# Patient Record
Sex: FEMALE | Race: WHITE | NOT HISPANIC OR LATINO | Employment: UNEMPLOYED | URBAN - METROPOLITAN AREA
[De-identification: names, ages, dates, MRNs, and addresses within clinical notes are randomized per-mention and may not be internally consistent; named-entity substitution may affect disease eponyms.]

---

## 2022-01-24 ENCOUNTER — TRANSCRIBE ORDERS (OUTPATIENT)
Dept: ADMINISTRATIVE | Facility: HOSPITAL | Age: 37
End: 2022-01-24

## 2022-01-24 DIAGNOSIS — R92.8 ABNORMAL MAMMOGRAM: ICD-10-CM

## 2022-01-24 DIAGNOSIS — G93.5 CHIARI I MALFORMATION: Primary | ICD-10-CM

## 2022-02-07 ENCOUNTER — HOSPITAL ENCOUNTER (OUTPATIENT)
Dept: MRI IMAGING | Facility: HOSPITAL | Age: 37
Discharge: HOME | End: 2022-02-07
Admitting: NURSE PRACTITIONER

## 2022-02-07 DIAGNOSIS — G93.5 CHIARI I MALFORMATION: ICD-10-CM

## 2022-02-07 PROCEDURE — 70551 MRI BRAIN STEM W/O DYE: CPT

## 2022-03-16 ENCOUNTER — HOSPITAL ENCOUNTER (OUTPATIENT)
Dept: MAMMOGRAPHY | Facility: HOSPITAL | Age: 37
Discharge: HOME | End: 2022-03-16
Admitting: NURSE PRACTITIONER

## 2022-03-16 ENCOUNTER — OFFICE VISIT (OUTPATIENT)
Dept: NEUROSURGERY | Facility: CLINIC | Age: 37
End: 2022-03-16

## 2022-03-16 VITALS — BODY MASS INDEX: 45.36 KG/M2 | WEIGHT: 256 LBS | HEIGHT: 63 IN

## 2022-03-16 DIAGNOSIS — Z86.69 HISTORY OF CHIARI MALFORMATION: Primary | ICD-10-CM

## 2022-03-16 DIAGNOSIS — R92.8 ABNORMAL MAMMOGRAM: ICD-10-CM

## 2022-03-16 PROBLEM — E66.9 OBESITY: Status: ACTIVE | Noted: 2017-10-02

## 2022-03-16 PROBLEM — M79.7 PRIMARY FIBROMYALGIA SYNDROME: Status: ACTIVE | Noted: 2022-03-16

## 2022-03-16 PROBLEM — G47.419 NARCOLEPSY: Status: ACTIVE | Noted: 2022-03-16

## 2022-03-16 PROBLEM — G93.5 CHIARI MALFORMATION TYPE I: Status: ACTIVE | Noted: 2017-10-02

## 2022-03-16 PROBLEM — R51.9 HEADACHE: Status: ACTIVE | Noted: 2022-03-16

## 2022-03-16 PROBLEM — G47.411 CATAPLEXY AND NARCOLEPSY: Status: ACTIVE | Noted: 2022-03-16

## 2022-03-16 PROCEDURE — 77066 DX MAMMO INCL CAD BI: CPT | Performed by: RADIOLOGY

## 2022-03-16 PROCEDURE — 77066 DX MAMMO INCL CAD BI: CPT

## 2022-03-16 PROCEDURE — 99203 OFFICE O/P NEW LOW 30 MIN: CPT | Performed by: NEUROLOGICAL SURGERY

## 2022-03-16 PROCEDURE — G0279 TOMOSYNTHESIS, MAMMO: HCPCS

## 2022-03-16 PROCEDURE — 77062 BREAST TOMOSYNTHESIS BI: CPT | Performed by: RADIOLOGY

## 2022-03-16 RX ORDER — RIZATRIPTAN BENZOATE 10 MG/1
TABLET ORAL
COMMUNITY

## 2022-03-16 NOTE — PROGRESS NOTES
Patient: Nusrat Valdovinos  : 1985    Primary Care Provider: Liyah Villalba APRN    Requesting Provider: As above        History    Chief Complaint: Chiari malformation.    History of Present Illness: Ms. Valdovinos is a 37-year-old unemployed woman who in 2017 underwent suboccipital decompression in [place] for a Chiari malformation.  Prior to that she had diminished sensation in her right leg and in her left arm.  That improved but some of the symptoms have recurred over the last 6 months.  Since surgery she has had a popping in her neck and suboccipital region that occurs with changing positions.  Her previous neurosurgeon was unsure as to the etiology for that.  One of her main complaints is mid and low back pain.    Review of Systems   Constitutional: Negative for activity change, appetite change, chills, diaphoresis, fatigue, fever and unexpected weight change.   HENT: Negative for congestion, dental problem, drooling, ear discharge, ear pain, facial swelling, hearing loss, mouth sores, nosebleeds, postnasal drip, rhinorrhea, sinus pressure, sneezing, sore throat, tinnitus, trouble swallowing and voice change.    Eyes: Negative for photophobia, pain, discharge, redness, itching and visual disturbance.   Respiratory: Negative for apnea, cough, choking, chest tightness, shortness of breath, wheezing and stridor.    Cardiovascular: Negative for chest pain, palpitations and leg swelling.   Gastrointestinal: Negative for abdominal distention, abdominal pain, anal bleeding, blood in stool, constipation, diarrhea, nausea, rectal pain and vomiting.   Endocrine: Negative for cold intolerance, heat intolerance, polydipsia, polyphagia and polyuria.   Genitourinary: Negative for decreased urine volume, difficulty urinating, dysuria, enuresis, flank pain, frequency, genital sores, hematuria and urgency.   Musculoskeletal: Negative for arthralgias, back pain, gait problem, joint swelling, myalgias,  "neck pain and neck stiffness.   Skin: Negative for color change, pallor, rash and wound.   Allergic/Immunologic: Negative for environmental allergies, food allergies and immunocompromised state.   Neurological: Negative for dizziness, tremors, seizures, syncope, facial asymmetry, speech difficulty, weakness, light-headedness, numbness and headaches.   Hematological: Negative for adenopathy. Does not bruise/bleed easily.   Psychiatric/Behavioral: Negative for agitation, behavioral problems, confusion, decreased concentration, dysphoric mood, hallucinations, self-injury, sleep disturbance and suicidal ideas. The patient is not nervous/anxious and is not hyperactive.    All other systems reviewed and are negative.      The patient's past medical history, past surgical history, family history, and social history have been reviewed at length in the electronic medical record.    Physical Exam:   Ht 160 cm (63\")   Wt 116 kg (256 lb)   BMI 45.35 kg/m²   CONSTITUTIONAL: Patient is well-nourished, pleasant and appears stated age.  MUSCULOSKELETAL:  Neck tenderness to palpation is not observed.   ROM in the neck is normal.  Well-healed midline occipital cervical incision is noted.  NEUROLOGICAL:  Orientation, memory, attention span, language function, and cognition have been examined and are intact.  Strength is intact in the upper and lower extremities to direct testing.  Muscle tone is normal throughout.  Station and gait are normal.  Sensation is intact to light touch testing throughout.  Deep tendon reflexes are 1+ and symmetrical.  Irais's Sign is negative bilaterally. No clonus is elicited.  Coordination is intact.      Medical Decision Making    Data Review:   (All imaging studies were personally reviewed unless stated otherwise)  MRI of the brain demonstrates the Chiari I malformation with the dorsal decompression visible.  The posterior ring of C1 has been removed.  I can visualize the spinal cord to the lower C4 " level and I do not see evidence of syringomyelia.    Diagnosis:   The patient is a history of Chiari malformation with some recurrent symptoms.    Treatment Options:   I am going to check an MRI of her thoracic and lumbar spine for completeness to rule out syringomyelia.  I am also to check flexion-extension lateral cervical spine x-rays to rule out instability.  She will follow-up in our clinic thereafter.       Diagnosis Plan   1. History of Chiari malformation  MRI Thoracic Spine Without Contrast    MRI Lumbar Spine Without Contrast    XR spine cervical flex and ext only       Scribed for Jp Vega MD by No Cordon CMA on 3/16/2022 11:32 EDT       I, Dr. Vega, personally performed the services described in the documentation, as scribed in my presence, and it is both accurate and complete.

## 2022-03-24 ENCOUNTER — TELEPHONE (OUTPATIENT)
Dept: FAMILY MEDICINE CLINIC | Facility: CLINIC | Age: 37
End: 2022-03-24

## 2022-03-24 NOTE — TELEPHONE ENCOUNTER
----- Message from KLAUDIA Gomez sent at 3/21/2022  8:18 AM EDT -----  Will you please let know that her mammogram had a few  abnormal areas and they would like to get a bilateral diagnostic mammo  and breast ultrasound. Thanks!

## 2022-03-30 ENCOUNTER — TELEPHONE (OUTPATIENT)
Dept: FAMILY MEDICINE CLINIC | Facility: CLINIC | Age: 37
End: 2022-03-30

## 2022-03-30 ENCOUNTER — HOSPITAL ENCOUNTER (OUTPATIENT)
Dept: ULTRASOUND IMAGING | Facility: HOSPITAL | Age: 37
Discharge: HOME | End: 2022-03-30

## 2022-03-30 ENCOUNTER — HOSPITAL ENCOUNTER (OUTPATIENT)
Dept: MAMMOGRAPHY | Facility: HOSPITAL | Age: 37
Discharge: HOME | End: 2022-03-30

## 2022-03-30 DIAGNOSIS — R92.8 ABNORMAL MAMMOGRAM: ICD-10-CM

## 2022-03-30 PROCEDURE — 76642 ULTRASOUND BREAST LIMITED: CPT

## 2022-03-30 PROCEDURE — G0279 TOMOSYNTHESIS, MAMMO: HCPCS

## 2022-03-30 PROCEDURE — 76642 ULTRASOUND BREAST LIMITED: CPT | Performed by: RADIOLOGY

## 2022-03-30 PROCEDURE — 77065 DX MAMMO INCL CAD UNI: CPT

## 2022-04-18 ENCOUNTER — HOSPITAL ENCOUNTER (OUTPATIENT)
Dept: MRI IMAGING | Facility: HOSPITAL | Age: 37
Discharge: HOME | End: 2022-04-18

## 2022-04-18 ENCOUNTER — HOSPITAL ENCOUNTER (OUTPATIENT)
Dept: GENERAL RADIOLOGY | Facility: HOSPITAL | Age: 37
Discharge: HOME | End: 2022-04-18

## 2022-04-18 DIAGNOSIS — Z86.69 HISTORY OF CHIARI MALFORMATION: ICD-10-CM

## 2022-04-18 PROCEDURE — 72146 MRI CHEST SPINE W/O DYE: CPT

## 2022-04-18 PROCEDURE — 72148 MRI LUMBAR SPINE W/O DYE: CPT

## 2022-04-18 PROCEDURE — 72040 X-RAY EXAM NECK SPINE 2-3 VW: CPT

## 2022-04-19 ENCOUNTER — OFFICE VISIT (OUTPATIENT)
Dept: NEUROSURGERY | Facility: CLINIC | Age: 37
End: 2022-04-19

## 2022-04-19 VITALS
HEIGHT: 63 IN | TEMPERATURE: 98 F | BODY MASS INDEX: 46.25 KG/M2 | WEIGHT: 261 LBS | DIASTOLIC BLOOD PRESSURE: 84 MMHG | SYSTOLIC BLOOD PRESSURE: 122 MMHG

## 2022-04-19 DIAGNOSIS — M54.9 MID BACK PAIN: ICD-10-CM

## 2022-04-19 DIAGNOSIS — G89.29 CHRONIC BILATERAL LOW BACK PAIN WITHOUT SCIATICA: ICD-10-CM

## 2022-04-19 DIAGNOSIS — M54.50 CHRONIC BILATERAL LOW BACK PAIN WITHOUT SCIATICA: ICD-10-CM

## 2022-04-19 DIAGNOSIS — M54.2 NECK PAIN: ICD-10-CM

## 2022-04-19 DIAGNOSIS — G93.5 CHIARI MALFORMATION TYPE I: Primary | ICD-10-CM

## 2022-04-19 PROCEDURE — 99214 OFFICE O/P EST MOD 30 MIN: CPT | Performed by: PHYSICIAN ASSISTANT

## 2022-04-19 NOTE — PROGRESS NOTES
Subjective     Chief Complaint: Chiari malformation, s/p decompression                                 Posterior neck pain                                 Mid and low back pain    Patient ID: Nusrat Valdovinos is a 37 y.o. female is here today for follow-up.    History of Present Illness   : Ms. Valdovinos is a 37-year-old unemployed woman who in October 2017 underwent suboccipital decompression in [place] for a Chiari malformation.  Prior to that she had diminished sensation in her right leg and in her left arm.  That improved but some of the symptoms have recurred over the last 6 months.  Since surgery she has had a popping in her neck and suboccipital region that occurs with changing positions. It is sometimes associated with an ascending occipital headache.Her previous neurosurgeon was unsure as to the etiology for that.    This pain is worse with prolonged driving or sitting without resting her neck    She has also had a midthoracic pain as well as a low back pain that radiates to both sides especially to her left hip.  The pain tends to flareup at least once a week but she has not been able to elucidate date any pattern to the flareups   Dr. Vega saw her on 3/16/2022 to establish care.  He reviewed an MRI of the brain that showed the Chiari I malformation with postoperative changes of removal of the posterior ring of C1 he ordered flexion-extension cervical x-rays, as well as a thoracic and lumbar MRI to rule out syringomyelia.    The following portions of the patient's history were reviewed and updated as appropriate: allergies, current medications, past family history, past medical history, past social history, past surgical history and problem list.    Family history:   Family History   Problem Relation Age of Onset   • Breast cancer Mother    • Arthritis Mother    • Asthma Mother    • Cancer Mother    • Diabetes Mother    • Early death Mother    • Hyperlipidemia Mother    • Other Mother         Lupus    • Ovarian cancer Maternal Grandmother    • Arthritis Maternal Grandmother    • Breast cancer Paternal Aunt    • Alcohol abuse Father    • Drug abuse Father    • Arthritis Maternal Grandfather    • Asthma Maternal Grandfather    • Diabetes Maternal Grandfather    • Hyperlipidemia Maternal Grandfather    • Anxiety disorder Daughter    • Depression Daughter    • Mental illness Daughter    • Anxiety disorder Maternal Aunt    • Arthritis Maternal Aunt    • Asthma Maternal Aunt    • Depression Maternal Aunt        Social history:   Social History     Socioeconomic History   • Marital status: Legally    Tobacco Use   • Smoking status: Current Every Day Smoker   • Smokeless tobacco: Never Used   Substance and Sexual Activity   • Alcohol use: Yes     Alcohol/week: 3.0 standard drinks     Types: 1 Glasses of wine, 2 Cans of beer per week     Comment: I drink mixed drinks once in awhile on special occasions   • Drug use: Never   • Sexual activity: Yes     Partners: Male     Birth control/protection: Surgical       Review of Systems   Constitutional: Positive for fatigue. Negative for activity change, appetite change, chills, diaphoresis, fever and unexpected weight change.   HENT: Positive for ear discharge, sinus pressure, sneezing and tinnitus. Negative for congestion, dental problem, drooling, ear pain, facial swelling, hearing loss, mouth sores, nosebleeds, postnasal drip, rhinorrhea, sinus pain, sore throat, trouble swallowing and voice change.    Eyes: Positive for itching and visual disturbance. Negative for photophobia, pain, discharge and redness.   Respiratory: Negative for apnea, cough, choking, chest tightness, shortness of breath, wheezing and stridor.    Cardiovascular: Negative for chest pain, palpitations and leg swelling.   Gastrointestinal: Negative for abdominal distention, abdominal pain, anal bleeding, blood in stool, constipation, diarrhea, nausea, rectal pain and vomiting.   Endocrine:  "Negative for cold intolerance, heat intolerance, polydipsia, polyphagia and polyuria.   Genitourinary: Positive for flank pain. Negative for decreased urine volume, difficulty urinating, dyspareunia, dysuria, enuresis, frequency, genital sores, hematuria, menstrual problem, pelvic pain, urgency, vaginal bleeding, vaginal discharge and vaginal pain.   Musculoskeletal: Positive for arthralgias, back pain, neck pain and neck stiffness. Negative for gait problem, joint swelling and myalgias.   Skin: Negative for color change, pallor, rash and wound.   Allergic/Immunologic: Positive for immunocompromised state. Negative for environmental allergies and food allergies.   Neurological: Positive for light-headedness and headaches. Negative for dizziness, tremors, seizures, syncope, facial asymmetry, speech difficulty, weakness and numbness.   Hematological: Negative for adenopathy. Does not bruise/bleed easily.   Psychiatric/Behavioral: Negative for agitation, behavioral problems, confusion, decreased concentration, dysphoric mood, hallucinations, self-injury, sleep disturbance and suicidal ideas. The patient is not nervous/anxious and is not hyperactive.        Objective   Blood pressure 122/84, temperature 98 °F (36.7 °C), temperature source Infrared, height 160 cm (63\"), weight 118 kg (261 lb).  Body mass index is 46.23 kg/m².    Physical Exam  CONSTITUTIONAL: Patient is well-nourished, pleasant and appears stated age.  MUSCULOSKELETAL:  Neck tenderness to palpation is not observed.   ROM in the neck is normal.  Well-healed midline occipital cervical incision is noted.  NEUROLOGICAL:  Orientation, memory, attention span, language function, and cognition have been examined and are intact.  Strength is intact in the upper and lower extremities to direct testing.  Muscle tone is normal throughout.  Station and gait are normal.  Sensation is intact to light touch testing throughout.  Deep tendon reflexes are 1+ and " symmetrical.  Irais's Sign is negative bilaterally. No clonus is elicited.  Coordination is intact.  BMI is 46.23      Radiology results. All studies were performed on 4/18/22. They were reviewed with Dr. Vega as well as with the patient in the exam room.     Flexion-extension x-rays of the cervical spine were reviewed today.  These show postoperative changes as noted above.  There is no evidence of instability with flexion or extension.  Degenerative changes are present but fairly mild    MRI of the thoracic spine was also reviewed.  This shows a very mild kyphosis of the mid thoracic spine but the spinal canal is patent and no significant degenerative disc disease or neuroforaminal impingement is noted    MRI of the lumbar spine also dated 4/18/2022 was reviewed.  This shows some degenerative disc disease especially at L4-5 and L5-S1 with broad-based disc bulges at these levels and possible right L5 foraminal stenosis    Assessment/Plan   There is no good radiographic explanation for Ms. Valdovinos's cervical pain.  MRI of brain also showed the upper part of the cervical spine and flexion-extension x-rays also did not note any instability or other pathology.    MRI of the thoracic spine was essentially normal.  MRI of the lumbar spine showed some mild degenerative changes.  She is not having any consistent radicular symptoms    We discussed exercise and weight loss.  She has been deconditioned due to to moving and caring for her children etc.  We stressed the need to prioritize self-care and exercise as well as physical therapy for core strengthening.  She is eager to participate with this as well.  We have ordered physical therapy and will plan to follow-up with her on an as-needed basis going forward    Diagnoses and all orders for this visit:    1. Chiari malformation type I (HCC) (Primary)  -     Ambulatory Referral to Physical Therapy    2. Chronic bilateral low back pain without sciatica  -     Ambulatory  Referral to Physical Therapy    3. Mid back pain  -     Ambulatory Referral to Physical Therapy    4. Neck pain  -     Ambulatory Referral to Physical Therapy        Return if symptoms worsen or fail to improve.        BRIANNA OrtegaC

## 2022-06-30 ENCOUNTER — TREATMENT (OUTPATIENT)
Dept: PHYSICAL THERAPY | Facility: CLINIC | Age: 37
End: 2022-06-30

## 2022-06-30 DIAGNOSIS — M54.2 PAIN, NECK: Primary | ICD-10-CM

## 2022-06-30 DIAGNOSIS — G89.29 CHRONIC BILATERAL LOW BACK PAIN, UNSPECIFIED WHETHER SCIATICA PRESENT: ICD-10-CM

## 2022-06-30 DIAGNOSIS — M54.50 CHRONIC BILATERAL LOW BACK PAIN, UNSPECIFIED WHETHER SCIATICA PRESENT: ICD-10-CM

## 2022-06-30 PROCEDURE — 97163 PT EVAL HIGH COMPLEX 45 MIN: CPT | Performed by: PHYSICAL THERAPIST

## 2022-06-30 PROCEDURE — 97110 THERAPEUTIC EXERCISES: CPT | Performed by: PHYSICAL THERAPIST

## 2022-06-30 NOTE — PROGRESS NOTES
"  Physical Therapy Initial Evaluation and Plan of Care      Patient: Nusrat Valdovinos   : 1985  Diagnosis/ICD-10 Code:  The primary encounter diagnosis was Pain, neck. A diagnosis of Chronic bilateral low back pain, unspecified whether sciatica present was also pertinent to this visit.   Referring practitioner: Debora Bailey PA-C  Date of Initial Visit: Type: THERAPY  Noted: 2022  Today's Date: 2022  Patient seen for 1 sessions         Visit Diagnoses:    ICD-10-CM ICD-9-CM   1. Pain, neck  M54.2 723.1   2. Chronic bilateral low back pain, unspecified whether sciatica present  M54.50 724.2    G89.29 338.29       Subjective Questionnaire: NDI: 48%    Subjective Evaluation    History of Present Illness  Onset date: chronic for more than 5 years.  Date of surgery: 5 years ago: dorsal decompression, ring of C1 removed.  Mechanism of injury: Pt has multiple complaints, including neck pain, intermittent B UE numbness several times per day, mid and low back pain, intermittent R LE (for the past 9 years, since she was 28 years old).  She had surgery 5 years ago in [place] for decompression of Chiari malformation.  She says her neck gets stiff and she has had popping since about 1 year post surgery. She reports headaches and intermittent dizziness (when looking up in clinic today).  She reports 1 fall in the past year.  She has been seen in [place] in Dr. Vega's office to establish care locally.  She has moved a lot in  family.    She has had recent imaging ordered and read by Dr. Vega.    Thoracic MRI-normal  Lumbar MRI-L5-S1 foraminal stenosis  C-spine flex/ext x-ray-stable    Subjective comment: neck pain, back pain (see details below)  Patient Occupation: realtor-in car a lot, on stairs a lot.  Likes reading, sewing, needlework, disc golf.  Has her own gym routine-\"everything brings on symptoms\" Pain  Current pain ratin  At best pain rating: 3 (best in am)  At worst pain " "ratin  Location: neck, upper/mid back, low back, intermittent R leg and B arm symptoms  Quality: R LE int numbness, B arms int numbness numbness/tingling.  Exacerbated by: looking down, driving, stairs (back/legs)  Progression: no change    Hand dominance: right    Diagnostic Tests  Abnormal x-ray: stable flex/ext imaging.  Abnormal MRI: see EMR.    Patient Goals  Patient goals for therapy: decreased pain, increased strength and increased motion (improve dizziness, know how to help symptoms)           Treatment            Functional Testing  Functional Tests Options: Neck Disability Index (NDI) 48%  Objective        Special Questions  Patient is experiencing dizziness and headaches.       Postural Observations  Seated posture: fair  Standing posture: fair    Additional Postural Observation Details  Protracted B scapuale    Neurological Testing     Sensation   Cervical/Thoracic   Left   Intact: light touch    Right   Intact: light touch    Lumbar   Left   Intact: light touch    Right   Intact: light touch    Reflexes   Left   Patellar (L4): normal (2+)  Achilles (S1): absent (0)    Right   Patellar (L4): absent (0)  Achilles (S1): absent (0)    Additional Neurological Details  Pt reports intermittent numbness in B arms and hands several times per day.  Pt reports intermittent R LE and B UE numbness.  She awakes in am with feet numb, this improves as she gets up and moves around.    Active Range of Motion   Cervical/Thoracic Spine   Normal active range of motion    Additional Active Range of Motion Details  Flexion: \"stabbing\" pain in back of neck  Extension: dizziness  B lateral flexion: contralateral upper cervical stretching  B rotation: feels a little tight, no pain  FIS: stretching, no pain or paresthesia  EIS: localized LBP-occurs mostly at TL junction  JUAN: no pain  EIL: deferred initially    B hip and hamstring flexibility WNL.    Strength/Myotome Testing     Left Shoulder     Planes of Motion   Flexion: " 4-   Abduction: 4   External rotation at 0°: 5   Internal rotation at 0°: 5     Right Shoulder     Planes of Motion   Flexion: 4-   Abduction: 4   External rotation at 0°: 5   Internal rotation at 0°: 5     Left Elbow   Flexion: 5  Extension: 5    Right Elbow   Flexion: 5  Extension: 5    Left Wrist/Hand   Wrist extension: 5  Wrist flexion: 5    Right Wrist/Hand   Wrist extension: 5  Wrist flexion: 5    Lumbar   Left   Heel walk: normal  Toe walk: normal    Right   Heel walk: normal  Toe walk: normal    Left Hip   Planes of Motion   Flexion: 4+  Left hip extension strength: NT.  Left hip abductors strength: NT.    Right Hip   Planes of Motion   Flexion: 4+  Right hip extension strength: NT.  Right hip abductors strength: NT.    Left Knee   Flexion: 5  Extension: 5    Right Knee   Flexion: 5  Extension: 4+    Left Ankle/Foot   Dorsiflexion: 5  Plantar flexion: 5  Great toe extension: 5    Right Ankle/Foot   Dorsiflexion: 5  Plantar flexion: 5  Great toe extension: 5    Tests       Thoracic   Negative slump.     Lumbar     Left   Negative passive SLR.     Right   Negative passive SLR.           Assessment & Plan     Assessment  Impairments: abnormal or restricted ROM, activity intolerance, impaired physical strength, lacks appropriate home exercise program and pain with function  Functional Limitations: carrying objects, lifting, sleeping, walking, uncomfortable because of pain, sitting, standing, stooping and unable to perform repetitive tasks  Assessment details: Pt describes involved medical and surgical history.  She indicates that she has gained weight and needs to be able to exercise to prevent further weight gain.  She has had neurological work-up recently and is referred to PT to assist in conditioning while also managing multiple symptoms/complaints.  Prognosis: good    Goals  Plan Goals: 4 weeks   1) Pt demonstrates independence and compliance with initial HEP.  2) Pt reports reduction in frequency,  intensity, and distribution of intermittent extremity symptoms.   3) Pain is decreased to no worse than 5/10 during activity.     8 weeks  1) Pt demonstrates independence in advanced HEP and self-management strategies to prevent/reduce symptom recurrence.  2) Pt reports pain is not constant, is localized, is no worse than 3/10, and is relieved by performance of prescribed HEP.  3) Pt indicates improving tolerance for activity before onset of symptoms.  4) NDI score is improved to 30% or better, indicating improving functional abilities.      Plan  Therapy options: will be seen for skilled therapy services  Planned modality interventions: cryotherapy and thermotherapy (hydrocollator packs)  Planned therapy interventions: manual therapy, neuromuscular re-education, postural training, soft tissue mobilization, strengthening, stretching, therapeutic activities, home exercise program, functional ROM exercises, flexibility, body mechanics training, abdominal trunk stabilization and balance/weight-bearing training  Frequency: 1x week  Duration in visits: 8  Treatment plan discussed with: patient  Plan details: PT weekly for 8 weeks, addressing pain and noted deficits affecting functional abilities.  Continue to assess symptom behavior and response to treatment.        Timed:  Manual Therapy:         mins  84407;  Therapeutic Exercise:    10     mins  00385;     Neuromuscular Parker:        mins  27943;    Therapeutic Activity:          mins  45035;     Gait Training:           mins  64272;     Ultrasound:          mins  94281;    Electrical Stimulation:         mins  65012 ( );  Iontophoresis  ___ mins   75250    Untimed:  Electrical Stimulation:         mins  31272 ( );  Mechanical Traction:         mins  66297;     Timed Treatment:   10   mins   Total Treatment:     40   mins    PT SIGNATURE: Ramona Howard PT   Electronically signed  DATE TREATMENT INITIATED: 6/30/2022    Initial  Certification  Certification Period: 6/30/2022thru9/27/2022  I certify that the therapy services are furnished while this patient is under my care.  The services outlined above are required by this patient, and will be reviewed every 90 days.    Physician Signature:_____________________________________________             PHYSICIAN: Debora Bailey PA-C  NPI: [id number]                                      DATE:       Please sign and return via fax to [phone].. Thank you, [facility].

## 2022-08-01 ENCOUNTER — TELEPHONE (OUTPATIENT)
Dept: PHYSICAL THERAPY | Facility: CLINIC | Age: 37
End: 2022-08-01

## 2022-08-05 ENCOUNTER — OFFICE VISIT (OUTPATIENT)
Dept: FAMILY MEDICINE CLINIC | Facility: CLINIC | Age: 37
End: 2022-08-05

## 2022-08-05 VITALS
HEIGHT: 63 IN | BODY MASS INDEX: 46.07 KG/M2 | OXYGEN SATURATION: 98 % | DIASTOLIC BLOOD PRESSURE: 84 MMHG | WEIGHT: 260 LBS | HEART RATE: 68 BPM | SYSTOLIC BLOOD PRESSURE: 120 MMHG

## 2022-08-05 DIAGNOSIS — D22.9 CHANGE IN MULTIPLE NEVI: ICD-10-CM

## 2022-08-05 DIAGNOSIS — J40 BRONCHITIS: Primary | ICD-10-CM

## 2022-08-05 PROCEDURE — 99214 OFFICE O/P EST MOD 30 MIN: CPT | Performed by: NURSE PRACTITIONER

## 2022-08-05 RX ORDER — AZITHROMYCIN 250 MG/1
TABLET, FILM COATED ORAL
Qty: 6 TABLET | Refills: 0 | Status: SHIPPED | OUTPATIENT
Start: 2022-08-05 | End: 2022-08-10

## 2022-08-05 RX ORDER — PREDNISONE 20 MG/1
TABLET ORAL
Qty: 18 TABLET | Refills: 0 | Status: SHIPPED | OUTPATIENT
Start: 2022-08-05 | End: 2022-09-28

## 2022-08-05 RX ORDER — DEXTROMETHORPHAN HYDROBROMIDE AND PROMETHAZINE HYDROCHLORIDE 15; 6.25 MG/5ML; MG/5ML
5 SOLUTION ORAL 4 TIMES DAILY PRN
Qty: 200 ML | Refills: 0 | Status: SHIPPED | OUTPATIENT
Start: 2022-08-05 | End: 2022-09-28

## 2022-08-05 NOTE — PROGRESS NOTES
"Chief Complaint  mole ripped off and Cough (Had covid x 1 month ago. Can't get rid of the cough. Head pressure, cough so hard it feels like she is going to pass out. Pain in back on head)    Subjective          Nusrat Lori Valdovinos presents to [facility]    Pt has had scattered nevi on her body for many years. Her bra strap catches a few on her back at times and causes injury to the lesion. She has had a non-productive cough x 1 month since her covid diagnosis. She denies fever, chills, myalgias, or dyspnea.       Objective   Vital Signs:   /84   Pulse 68   Ht 160 cm (63\")   Wt 118 kg (260 lb)   SpO2 98%   BMI 46.06 kg/m²     Body mass index is 46.06 kg/m².    Review of Systems   Constitutional: Negative for fatigue and fever.   Respiratory: Negative for shortness of breath.    Cardiovascular: Negative for chest pain, palpitations and leg swelling.   Neurological: Negative for syncope.   Psychiatric/Behavioral: The patient is not nervous/anxious.           Current Outpatient Medications:   •  rizatriptan (MAXALT) 10 MG tablet, Maxalt 10 mg tablet, Disp: , Rfl:   •  azithromycin (Zithromax Z-Miguel) 250 MG tablet, Take 2 tablets by mouth Daily for 1 day, THEN 1 tablet Daily for 4 days., Disp: 6 tablet, Rfl: 0  •  predniSONE (DELTASONE) 20 MG tablet, 3 QD x 3 days, 2 QD x 3 days, 1 QD x 3 days, Disp: 18 tablet, Rfl: 0  •  promethazine-dextromethorphan (PROMETHAZINE-DM) 6.25-15 MG/5ML solution, Take 5 mL by mouth 4 (Four) Times a Day As Needed for Cough., Disp: 200 mL, Rfl: 0      Allergies: Cipro [ciprofloxacin hcl] and Latex    Physical Exam  Constitutional:       Appearance: Normal appearance.   HENT:      Head: Normocephalic.   Eyes:      Conjunctiva/sclera: Conjunctivae normal.      Pupils: Pupils are equal, round, and reactive to light.   Cardiovascular:      Rate and Rhythm: Normal rate and regular rhythm.      Heart sounds: Normal heart sounds.   Pulmonary:      Effort: " Pulmonary effort is normal.      Breath sounds: Normal breath sounds.   Abdominal:      Tenderness: There is no abdominal tenderness.   Musculoskeletal:         General: Normal range of motion.   Skin:     General: Skin is warm and dry.      Capillary Refill: Capillary refill takes less than 2 seconds.      Comments: Scattered nevi on back   Neurological:      General: No focal deficit present.      Mental Status: She is alert and oriented to person, place, and time.   Psychiatric:         Mood and Affect: Mood normal.         Behavior: Behavior normal.         Thought Content: Thought content normal.         Judgment: Judgment normal.          Result Review :                   Assessment and Plan    Diagnoses and all orders for this visit:    1. Bronchitis (Primary)  Comments:  Finish antibiotics and prednisone. Phen DM PRN cough. Increase fluids. Return for worsened sx and if not improving in 1 week.   Orders:  -     predniSONE (DELTASONE) 20 MG tablet; 3 QD x 3 days, 2 QD x 3 days, 1 QD x 3 days  Dispense: 18 tablet; Refill: 0  -     azithromycin (Zithromax Z-Miguel) 250 MG tablet; Take 2 tablets by mouth Daily for 1 day, THEN 1 tablet Daily for 4 days.  Dispense: 6 tablet; Refill: 0  -     promethazine-dextromethorphan (PROMETHAZINE-DM) 6.25-15 MG/5ML solution; Take 5 mL by mouth 4 (Four) Times a Day As Needed for Cough.  Dispense: 200 mL; Refill: 0    2. Change in multiple nevi  Comments:  Follow up with dermatology.  Orders:  -     Ambulatory Referral to Dermatology                Follow Up   Return in about 1 week (around 8/12/2022) for if not improving or sooner if symptoms worsen.  Patient was given instructions and counseling regarding her condition or for health maintenance advice. Please see specific information pulled into the AVS if appropriate.     KLAUDIA Zuniga

## 2022-08-08 ENCOUNTER — TREATMENT (OUTPATIENT)
Dept: PHYSICAL THERAPY | Facility: CLINIC | Age: 37
End: 2022-08-08

## 2022-08-08 DIAGNOSIS — G89.29 CHRONIC BILATERAL LOW BACK PAIN, UNSPECIFIED WHETHER SCIATICA PRESENT: ICD-10-CM

## 2022-08-08 DIAGNOSIS — M54.50 CHRONIC BILATERAL LOW BACK PAIN, UNSPECIFIED WHETHER SCIATICA PRESENT: ICD-10-CM

## 2022-08-08 DIAGNOSIS — M54.2 PAIN, NECK: Primary | ICD-10-CM

## 2022-08-08 PROCEDURE — 97110 THERAPEUTIC EXERCISES: CPT | Performed by: PHYSICAL THERAPIST

## 2022-08-08 NOTE — PROGRESS NOTES
Physical Therapy Progress Note    Patient: Nusrat Valdovinos   : 1985  Diagnosis/ICD-10 Code:  The primary encounter diagnosis was Pain, neck. A diagnosis of Chronic bilateral low back pain, unspecified whether sciatica present was also pertinent to this visit.   Referring practitioner: Debora Bailey PA-C  Date of Initial Visit: Type: THERAPY  Noted: 2022  Today's Date: 2022  Visit:  2     Nusrat Valdovinos reports: she is about the same.  Since her initial evaluation, she has been to [place] to  her son.  While there, she estimates she walked up to 10 miles per day on several days.  Yesterday, she visited the [facility] encounter in [facility].  She was on her feet a lot.  She reports moderate low back and R leg pain today.  (Gait is initially antalgic when entering department).    Subjective   NDI score improved from 48% to 38%.    Treatment  Pre-treatment pain:  5  Post-treatment pain:  5    Exercise 1  Exercise Name 1: Reassessment (first follow up since evaluation on 22)  Exercise 2  Exercise Name 2: HEP review and practice  Exercise 3  Exercise Name 3: addition to HEP-supine hip flexor stretch  Exercise 4  Exercise Name 4: hooklying lower abdominal activation (legs elevated/supported on table)  Exercise 5  Exercise Name 5: hooklying hip adduction ball squeeze  Exercise 6  Exercise Name 6: abdominal crunch  Exercise 7  Exercise Name 7: standing hip flexor stretch (better tolerated in supine)  Exercise 8  Exercise Name 8: shallow wall squat-bothers R knee-on hold for now             Objective     Assessment & Plan     Assessment    Assessment details: Pt demonstrates improving gait quality by end of session.  She will benefit from gradually progressive exercises as tolerated.    Goals  Plan Goals: Plan Goals: 4 weeks   1) Pt demonstrates independence and compliance with initial HEP-MET.  2) Pt reports reduction in frequency, intensity, and distribution of intermittent extremity  symptoms-not met.   3) Pain is decreased to no worse than 5/10 during activity-not met.     8 weeks-progressing/ongoing  1) Pt demonstrates independence in advanced HEP and self-management strategies to prevent/reduce symptom recurrence.  2) Pt reports pain is not constant, is localized, is no worse than 3/10, and is relieved by performance of prescribed HEP.  3) Pt indicates improving tolerance for activity before onset of symptoms.  4) NDI score is improved to 30% or better, indicating improving functional abilities.    Plan  Plan details: Continue per POC.               Timed:  Manual Therapy:         mins  38302;  Therapeutic Exercise:    40     mins  87143;     Neuromuscular Parker:        mins  50504;    Therapeutic Activity:          mins  01749;     Gait Training:           mins  78704;     Ultrasound:          mins  88984;    Electrical Stimulation:         mins  65924 ( );    Untimed:  Electrical Stimulation:         mins  39028 ( );  Mechanical Traction:         mins  07911;     Timed Treatment:   40   mins   Total Treatment:     40   mins      Ramona Howard, PT  Physical Therapist

## 2022-08-10 ENCOUNTER — TELEPHONE (OUTPATIENT)
Dept: FAMILY MEDICINE CLINIC | Facility: CLINIC | Age: 37
End: 2022-08-10

## 2022-08-10 NOTE — TELEPHONE ENCOUNTER
Pt has finished the abx and is now on the lowest dose of the steroid. She is not any better, she having hard time breathing. No appts available.

## 2022-08-11 ENCOUNTER — OFFICE VISIT (OUTPATIENT)
Dept: FAMILY MEDICINE CLINIC | Facility: CLINIC | Age: 37
End: 2022-08-11

## 2022-08-11 VITALS
OXYGEN SATURATION: 96 % | HEIGHT: 63 IN | DIASTOLIC BLOOD PRESSURE: 84 MMHG | SYSTOLIC BLOOD PRESSURE: 122 MMHG | WEIGHT: 268 LBS | HEART RATE: 82 BPM | BODY MASS INDEX: 47.48 KG/M2

## 2022-08-11 DIAGNOSIS — J40 BRONCHITIS: Primary | ICD-10-CM

## 2022-08-11 DIAGNOSIS — R05.9 COUGH: ICD-10-CM

## 2022-08-11 PROCEDURE — 99214 OFFICE O/P EST MOD 30 MIN: CPT | Performed by: NURSE PRACTITIONER

## 2022-08-11 RX ORDER — CEFDINIR 300 MG/1
300 CAPSULE ORAL 2 TIMES DAILY
Qty: 20 CAPSULE | Refills: 0 | Status: SHIPPED | OUTPATIENT
Start: 2022-08-11 | End: 2022-09-28

## 2022-08-11 RX ORDER — ALBUTEROL SULFATE 2.5 MG/3ML
2.5 SOLUTION RESPIRATORY (INHALATION) EVERY 4 HOURS PRN
Qty: 100 ML | Refills: 12 | Status: SHIPPED | OUTPATIENT
Start: 2022-08-11 | End: 2022-09-28

## 2022-08-11 RX ORDER — FLUCONAZOLE 150 MG/1
150 TABLET ORAL
Qty: 3 TABLET | Refills: 0 | Status: SHIPPED | OUTPATIENT
Start: 2022-08-11 | End: 2022-09-28

## 2022-08-11 NOTE — PROGRESS NOTES
"Chief Complaint  Cough (No imp)    Subjective          Nusrat Lori Valdovinos presents to [facility]  Pt has had cough and congestion x 1 month. She has been taking prednisone and finished Z-brook but states her sx have not improved. She denies fever, chills, or myalgias. She reports wheezing and dyspnea at times.       Objective   Vital Signs:   /84   Pulse 82   Ht 160 cm (63\")   Wt 122 kg (268 lb)   SpO2 96%   BMI 47.47 kg/m²     Body mass index is 47.47 kg/m².    Review of Systems   Constitutional: Negative for fatigue and fever.   HENT: Positive for congestion.    Respiratory: Positive for cough and wheezing. Negative for shortness of breath.    Cardiovascular: Negative for chest pain, palpitations and leg swelling.   Neurological: Negative for syncope.   Psychiatric/Behavioral: The patient is not nervous/anxious.           Current Outpatient Medications:   •  predniSONE (DELTASONE) 20 MG tablet, 3 QD x 3 days, 2 QD x 3 days, 1 QD x 3 days, Disp: 18 tablet, Rfl: 0  •  promethazine-dextromethorphan (PROMETHAZINE-DM) 6.25-15 MG/5ML solution, Take 5 mL by mouth 4 (Four) Times a Day As Needed for Cough., Disp: 200 mL, Rfl: 0  •  rizatriptan (MAXALT) 10 MG tablet, Maxalt 10 mg tablet, Disp: , Rfl:   •  albuterol (PROVENTIL) (2.5 MG/3ML) 0.083% nebulizer solution, Take 2.5 mg by nebulization Every 4 (Four) Hours As Needed for Wheezing., Disp: 100 mL, Rfl: 12  •  cefdinir (OMNICEF) 300 MG capsule, Take 1 capsule by mouth 2 (Two) Times a Day., Disp: 20 capsule, Rfl: 0  •  fluconazole (Diflucan) 150 MG tablet, Take 1 tablet by mouth Every 3 (Three) Days., Disp: 3 tablet, Rfl: 0      Allergies: Cipro [ciprofloxacin hcl] and Latex    Physical Exam  Constitutional:       Appearance: Normal appearance.   HENT:      Head: Normocephalic.   Eyes:      Conjunctiva/sclera: Conjunctivae normal.      Pupils: Pupils are equal, round, and reactive to light.   Cardiovascular:      Rate and " Rhythm: Normal rate and regular rhythm.      Heart sounds: Normal heart sounds.   Pulmonary:      Effort: Pulmonary effort is normal.      Breath sounds: Normal breath sounds.   Abdominal:      Tenderness: There is no abdominal tenderness.   Musculoskeletal:         General: Normal range of motion.   Skin:     General: Skin is warm and dry.      Capillary Refill: Capillary refill takes less than 2 seconds.   Neurological:      General: No focal deficit present.      Mental Status: She is alert and oriented to person, place, and time.   Psychiatric:         Mood and Affect: Mood normal.         Behavior: Behavior normal.         Thought Content: Thought content normal.         Judgment: Judgment normal.          Result Review :                   Assessment and Plan    Diagnoses and all orders for this visit:    1. Bronchitis (Primary)  Comments:  Increase fluids and Mucinex for cough. Albuterol as needed for wheezing. Finish prednisone. Add Cefdinir. Diflucan as needed.   Orders:  -     albuterol (PROVENTIL) (2.5 MG/3ML) 0.083% nebulizer solution; Take 2.5 mg by nebulization Every 4 (Four) Hours As Needed for Wheezing.  Dispense: 100 mL; Refill: 12  -     cefdinir (OMNICEF) 300 MG capsule; Take 1 capsule by mouth 2 (Two) Times a Day.  Dispense: 20 capsule; Refill: 0  -     fluconazole (Diflucan) 150 MG tablet; Take 1 tablet by mouth Every 3 (Three) Days.  Dispense: 3 tablet; Refill: 0    2. Cough  -     XR Chest PA & Lateral; Future                Follow Up   Return in about 1 week (around 8/18/2022) for if not improving or sooner if symptoms worsen.  Patient was given instructions and counseling regarding her condition or for health maintenance advice. Please see specific information pulled into the AVS if appropriate.     KLAUDIA Zuniga

## 2022-08-15 ENCOUNTER — TREATMENT (OUTPATIENT)
Dept: PHYSICAL THERAPY | Facility: CLINIC | Age: 37
End: 2022-08-15

## 2022-08-15 DIAGNOSIS — G89.29 CHRONIC BILATERAL LOW BACK PAIN, UNSPECIFIED WHETHER SCIATICA PRESENT: ICD-10-CM

## 2022-08-15 DIAGNOSIS — M54.50 CHRONIC BILATERAL LOW BACK PAIN, UNSPECIFIED WHETHER SCIATICA PRESENT: ICD-10-CM

## 2022-08-15 DIAGNOSIS — M54.2 PAIN, NECK: Primary | ICD-10-CM

## 2022-08-15 PROCEDURE — 97110 THERAPEUTIC EXERCISES: CPT | Performed by: PHYSICAL THERAPIST

## 2022-08-15 NOTE — PROGRESS NOTES
Physical Therapy Treatment Note  VISIT: 3          Subjective    Nusrat Lori Valdovinos reports: increase in headache due to persistent cough for 3 weeks.  Her low back is not as bad today.      Pre-treatment pain:  4 (low back)  Post-treatment pain:  4      Objective    Treatment    Exercise 1  Exercise Name 1: Nu-Step  Equipment/Resistance 1: level 6  Time: 6'  Exercise 2  Exercise Name 2: supine chin tuck  Exercise 3  Exercise Name 3: supine cervical rotation  Exercise 4  Exercise Name 4: hip adduction/ball squeeze  Exercise 5  Exercise Name 5: lower abdominal hooklying march  Exercise 6  Exercise Name 6: Total gym squats    Manual Rx 1  Manual Rx 1 Location: cervical spine  Manual Rx 1 Type: trial of suboccipital release-not well tolerated or helpful today            Assessment & Plan     Assessment    Assessment details: Exercise tolerance limited by headache, coughing.  LBP less intense than last week.  Knee is also feeling better.    Plan  Plan details: Continue PT.  Progress exercise as tolerated.               Timed:  Manual Therapy:    1     mins  51332;  Therapeutic Exercise:    28     mins  57020;     Neuromuscular Parker:        mins  27886;    Therapeutic Activity:          mins  10636;     Gait Training:           mins  99979;     Ultrasound:          mins  70189;    Electrical Stimulation:         mins  15682 ( );    Untimed:  Electrical Stimulation:         mins  69216 ( );  Mechanical Traction:         mins  10161;  Canalith Repositioning       mins  07564    Timed Treatment:   29   mins   Total Treatment:     29   mins      Ramona Howard, PT  Physical Therapist

## 2022-08-22 ENCOUNTER — TREATMENT (OUTPATIENT)
Dept: PHYSICAL THERAPY | Facility: CLINIC | Age: 37
End: 2022-08-22

## 2022-08-22 DIAGNOSIS — M54.50 CHRONIC BILATERAL LOW BACK PAIN, UNSPECIFIED WHETHER SCIATICA PRESENT: Primary | ICD-10-CM

## 2022-08-22 DIAGNOSIS — M54.2 PAIN, NECK: ICD-10-CM

## 2022-08-22 DIAGNOSIS — G89.29 CHRONIC BILATERAL LOW BACK PAIN, UNSPECIFIED WHETHER SCIATICA PRESENT: Primary | ICD-10-CM

## 2022-08-22 PROCEDURE — 97110 THERAPEUTIC EXERCISES: CPT | Performed by: PHYSICAL THERAPIST

## 2022-08-22 NOTE — PROGRESS NOTES
Physical Therapy Treatment Note  VISIT: 4          Subjective    Nusrat Lori Valdovinos reports: her abdominals are sore from her workout last Friday.  She has new gym membership.  Her headache is down to a dull ache.      Pre-treatment pain:  5  Post-treatment pain:  5      Objective    Treatment    Exercise 1  Exercise Name 1: supine chin tucks  Exercise 2  Exercise Name 2: PPT  Exercise 3  Exercise Name 3: chin tuck+PPT+LE march stabilization  Exercise 4  Exercise Name 4: seated reverse crunch/rollback  Exercise 5  Exercise Name 5: TG squats  Exercise 6  Exercise Name 6: OH ball press while on TG  Exercise 7  Exercise Name 7: modified planks-UE's on countertop  Exercise 8  Exercise Name 8: modified mt climbers                 Assessment & Plan     Assessment    Assessment details: Pt is resuming gym workouts.  She indicates good body awareness and ability to differentiate symptoms from exercise soreness.    Plan  Plan details: Continue PT for remaining scheduled sessions as pt transitions to independent exercise at gym.               Timed:  Manual Therapy:         mins  61620;  Therapeutic Exercise:    30     mins  07468;     Neuromuscular Parker:        mins  81854;    Therapeutic Activity:          mins  90109;     Gait Training:           mins  36654;     Ultrasound:          mins  87643;    Electrical Stimulation:         mins  64197 ( );    Untimed:  Electrical Stimulation:         mins  13421 ( );  Mechanical Traction:         mins  58542;  Canalith Repositioning       mins  95088    Timed Treatment:  30    mins   Total Treatment:     30   mins      Ramona Howard, PT  Physical Therapist

## 2022-08-29 ENCOUNTER — TREATMENT (OUTPATIENT)
Dept: PHYSICAL THERAPY | Facility: CLINIC | Age: 37
End: 2022-08-29

## 2022-08-29 DIAGNOSIS — M54.2 PAIN, NECK: ICD-10-CM

## 2022-08-29 DIAGNOSIS — G89.29 CHRONIC BILATERAL LOW BACK PAIN, UNSPECIFIED WHETHER SCIATICA PRESENT: Primary | ICD-10-CM

## 2022-08-29 DIAGNOSIS — M54.50 CHRONIC BILATERAL LOW BACK PAIN, UNSPECIFIED WHETHER SCIATICA PRESENT: Primary | ICD-10-CM

## 2022-08-29 PROCEDURE — 97110 THERAPEUTIC EXERCISES: CPT | Performed by: PHYSICAL THERAPIST

## 2022-08-29 PROCEDURE — 97140 MANUAL THERAPY 1/> REGIONS: CPT | Performed by: PHYSICAL THERAPIST

## 2022-08-29 NOTE — PROGRESS NOTES
"             Physical Therapy Treatment Note  VISIT: 5          Subjective    Nusrat Lori Valdovinos reports: She continues to work out at gym.  She describes mild, but persistent R occipital headache since she was sick with frequent coughing a few weeks ago.  She reports intermittent low back pain which is sharp/stabbing and feels like it needs to be \"pushed back into place\".      Pre-treatment pain:  3  Post-treatment pain:  3      Objective    Treatment    Exercise 1  Exercise Name 1: supine PPT  Exercise 2  Exercise Name 2: DNF head lifts-increases pain-not continued  Exercise 3  Exercise Name 3: Total Gym squats  Exercise 4  Exercise Name 4: ball taps to knees with marching on total gym  Exercise 5  Exercise Name 5: review of existing HEP  Exercise 6  Exercise Name 6: reverse crunches  Exercise 7  Exercise Name 7: cervical rotation MWM with towel    Manual Rx 1  Manual Rx 1 Location: cervical spine (pain at R occipital area with left rotation  Manual Rx 1 Type: C2 SNAG L with R rotation seemed most relieving to symptoms            Assessment & Plan     Assessment    Assessment details: Pt is doing well transitioning back to gym routine.  She has intermittent LBP.  She will try resuming press ups which had been helpful for her before.  She will also try self-SNAG with rotation to potentially alleviate headache.    Plan  Plan details: Continue next week.  Determine need for additional intervention vs transition to independent exercise.               Timed:  Manual Therapy:    8     mins  92563;  Therapeutic Exercise:    30     mins  95305;     Neuromuscular Parker:        mins  66696;    Therapeutic Activity:          mins  95372;     Gait Training:           mins  20363;     Ultrasound:          mins  14920;    Electrical Stimulation:         mins  00458 ( );    Untimed:  Electrical Stimulation:         mins  33909 ( );  Mechanical Traction:         mins  33020;  Canalith Repositioning       mins  " [id number]    Timed Treatment:   38   mins   Total Treatment:     38   mins      Ramona Howard, PT  Physical Therapist

## 2022-09-28 ENCOUNTER — OFFICE VISIT (OUTPATIENT)
Dept: FAMILY MEDICINE CLINIC | Facility: CLINIC | Age: 37
End: 2022-09-28

## 2022-09-28 ENCOUNTER — TELEPHONE (OUTPATIENT)
Dept: FAMILY MEDICINE CLINIC | Facility: CLINIC | Age: 37
End: 2022-09-28

## 2022-09-28 VITALS
DIASTOLIC BLOOD PRESSURE: 78 MMHG | OXYGEN SATURATION: 98 % | BODY MASS INDEX: 46.49 KG/M2 | HEART RATE: 80 BPM | SYSTOLIC BLOOD PRESSURE: 112 MMHG | HEIGHT: 63 IN | WEIGHT: 262.4 LBS

## 2022-09-28 DIAGNOSIS — M79.605 LEFT LEG PAIN: ICD-10-CM

## 2022-09-28 DIAGNOSIS — S80.812A ABRASION OF LEFT LOWER EXTREMITY, INITIAL ENCOUNTER: ICD-10-CM

## 2022-09-28 DIAGNOSIS — M25.562 ACUTE PAIN OF LEFT KNEE: ICD-10-CM

## 2022-09-28 DIAGNOSIS — M25.511 ACUTE PAIN OF RIGHT SHOULDER: Primary | ICD-10-CM

## 2022-09-28 PROCEDURE — 99214 OFFICE O/P EST MOD 30 MIN: CPT | Performed by: NURSE PRACTITIONER

## 2022-09-28 RX ORDER — CEPHALEXIN 500 MG/1
500 CAPSULE ORAL 2 TIMES DAILY
Qty: 20 CAPSULE | Refills: 0 | Status: SHIPPED | OUTPATIENT
Start: 2022-09-28

## 2022-09-28 RX ORDER — PREDNISONE 20 MG/1
TABLET ORAL
Qty: 18 TABLET | Refills: 0 | Status: SHIPPED | OUTPATIENT
Start: 2022-09-28 | End: 2022-10-07

## 2022-09-28 NOTE — ASSESSMENT & PLAN NOTE
X-rays completed.  Will let know if radiologist sees anything.  We will try course of prednisone.  Avoid NSAIDs.  RICE encouraged.  Risk of meds discussed understood.  If not noticeably improved in a week she will let me know so that we can then discuss further evaluation with orthopedic referral versus MRI.  Obviously she will let me know sooner if worsens.  Return to clinic or ED with any issues or concerns.

## 2022-09-28 NOTE — PROGRESS NOTES
"Chief Complaint  Fall    Subjective          Nusrat Valdovinos presents to [facility]  History of Present Illness     Patient states 16 days ago she tripped and fell and landed on her left knee and then landed on her right shoulder.  States she has had medial knee pain and medial upper lower leg pain.  She also has an abrasion on the front of her upper left lower leg that is scabbed over but does have surrounding erythema.  States there is discharge from the area at times.  No fever no chills no body aches.    Also states pain with in her right shoulder and states it feels like a deep pain.  States she feels like some weakness at times.  No redness no warmth no swelling no lesions of the shoulder.  Has full range of motion of her shoulder but states there is pain when she gets it raised higher than shoulder level.    Objective   Vital Signs:   /78   Pulse 80   Ht 160 cm (63\")   Wt 119 kg (262 lb 6.4 oz)   SpO2 98%   BMI 46.48 kg/m²     Body mass index is 46.48 kg/m².    Review of Systems   Constitutional: Negative for chills and fever.   Cardiovascular: Negative for chest pain and leg swelling.   Musculoskeletal: Positive for arthralgias. Negative for back pain and joint swelling.   Skin: Positive for wound.   Neurological: Negative for dizziness, numbness and headache.       Past History:  Medical History: has a past medical history of Backache, Common migraine, Fibromyalgia, Headache (1998), History of Chiari malformation, Low back pain (2008), MRSA (methicillin resistant Staphylococcus aureus) (4/2009), PCOS (polycystic ovarian syndrome), and Seizures ([facility]) (2017).   Surgical History: has a past surgical history that includes Breast biopsy; Brain surgery (10/2017); and Tonsillectomy.   Family History: family history includes Alcohol abuse in her father; Allergies in her mother and another family member; Anxiety disorder in her daughter and maternal aunt; Arthritis in " her maternal aunt, maternal grandfather, maternal grandmother, and mother; Asthma in her maternal aunt, maternal grandfather, mother, and another family member; Breast cancer in her mother and paternal aunt; Cancer in her mother; Depression in her daughter and maternal aunt; Diabetes in her maternal grandfather, mother, and another family member; Drug abuse in her father; Early death in her mother; Hyperlipidemia in her maternal grandfather and mother; Mental illness in her daughter, mother, and another family member; Migraines in her mother; Other in her mother; Ovarian cancer in her maternal grandmother.   Social History: reports that she quit smoking about 2 months ago. Her smoking use included cigarettes. She started smoking about 17 years ago. She has a 5.50 pack-year smoking history. She has never used smokeless tobacco. She reports current alcohol use of about 3.0 standard drinks of alcohol per week. She reports that she does not use drugs.    PHQ-2 Depression Screening  Little interest or pleasure in doing things? 0-->not at all   Feeling down, depressed, or hopeless? 0-->not at all   PHQ-2 Total Score 0        PHQ-9 Depression Screening  Little interest or pleasure in doing things? 0-->not at all   Feeling down, depressed, or hopeless? 0-->not at all   Trouble falling or staying asleep, or sleeping too much?     Feeling tired or having little energy?     Poor appetite or overeating?     Feeling bad about yourself - or that you are a failure or have let yourself or your family down?     Trouble concentrating on things, such as reading the newspaper or watching television?     Moving or speaking so slowly that other people could have noticed? Or the opposite - being so fidgety or restless that you have been moving around a lot more than usual?     Thoughts that you would be better off dead, or of hurting yourself in some way?     PHQ-9 Total Score 0   If you checked off any problems, how difficult have these  problems made it for you to do your work, take care of things at home, or get along with other people?       PHQ-9 Total Score: 0      Patient screened positive for depression based on a PHQ-9 score of 0 on 9/28/2022. Follow-up recommendations include:       Current Outpatient Medications:   •  rizatriptan (MAXALT) 10 MG tablet, Maxalt 10 mg tablet, Disp: , Rfl:   •  cephalexin (Keflex) 500 MG capsule, Take 1 capsule by mouth 2 (Two) Times a Day., Disp: 20 capsule, Rfl: 0  •  predniSONE (DELTASONE) 20 MG tablet, Take 3 tablets by mouth Daily for 3 days, THEN 2 tablets Daily for 3 days, THEN 1 tablet Daily for 3 days., Disp: 18 tablet, Rfl: 0   (Not in a hospital admission)     Allergies: Cipro [ciprofloxacin hcl] and Latex    Physical Exam  Constitutional:       Appearance: Normal appearance.   Musculoskeletal:      Right shoulder: No swelling, laceration, tenderness or bony tenderness. Decreased range of motion. Normal strength. Normal pulse.      Left knee: No swelling, erythema, ecchymosis or crepitus. Decreased range of motion. Tenderness present. Normal pulse.      Comments: Tenderness present to medial left knee.  Left upper lower leg (more medial) slightly tender to palpation.   Skin:         Neurological:      General: No focal deficit present.      Mental Status: She is alert and oriented to person, place, and time. Mental status is at baseline.   Psychiatric:         Mood and Affect: Mood normal.         Behavior: Behavior normal.         Thought Content: Thought content normal.         Judgment: Judgment normal.          Result Review :                   Assessment and Plan    Diagnoses and all orders for this visit:    1. Acute pain of right shoulder (Primary)  Assessment & Plan:  X-rays completed.  Will let know if radiologist sees anything.  We will try course of prednisone.  Avoid NSAIDs.  RICE encouraged.  Risk of meds discussed understood.  If not noticeably improved in a week she will let me know  so that we can then discuss further evaluation with orthopedic referral versus MRI.  Obviously she will let me know sooner if worsens.  Return to clinic or ED with any issues or concerns.    Orders:  -     XR Shoulder 2+ View Right; Future  -     predniSONE (DELTASONE) 20 MG tablet; Take 3 tablets by mouth Daily for 3 days, THEN 2 tablets Daily for 3 days, THEN 1 tablet Daily for 3 days.  Dispense: 18 tablet; Refill: 0    2. Left leg pain  Assessment & Plan:  X-rays completed.  Will let know if radiologist sees anything.  We will try course of prednisone.  Avoid NSAIDs.  RICE encouraged.  Risk of meds discussed understood.  If not noticeably improved in a week she will let me know so that we can then discuss further evaluation with orthopedic referral versus MRI.  Obviously she will let me know sooner if worsens.  Return to clinic or ED with any issues or concerns.    Orders:  -     XR Tibia Fibula 2 View Left (In Office)  -     predniSONE (DELTASONE) 20 MG tablet; Take 3 tablets by mouth Daily for 3 days, THEN 2 tablets Daily for 3 days, THEN 1 tablet Daily for 3 days.  Dispense: 18 tablet; Refill: 0    3. Acute pain of left knee  Assessment & Plan:  X-rays completed.  Will let know if radiologist sees anything.  We will try course of prednisone.  Avoid NSAIDs.  RICE encouraged.  Risk of meds discussed understood.  If not noticeably improved in a week she will let me know so that we can then discuss further evaluation with orthopedic referral versus MRI.  Obviously she will let me know sooner if worsens.  Return to clinic or ED with any issues or concerns.    Orders:  -     XR Knee 1 or 2 View Left (In Office)    4. Abrasion of left lower extremity, initial encounter  Assessment & Plan:  Antibiotic as prescribed.  Keep clean with soap and water.  Risk discussed understood.  Patient states no risk chance of pregnancy.  Risk of meds discussed understood.  Return in 2 days if no improvement, sooner if worsens.  Return  to clinic or ED with any issues or concerns.    Orders:  -     cephalexin (Keflex) 500 MG capsule; Take 1 capsule by mouth 2 (Two) Times a Day.  Dispense: 20 capsule; Refill: 0                   Follow Up   Return if symptoms worsen or fail to improve.  Patient was given instructions and counseling regarding her condition or for health maintenance advice. Please see specific information pulled into the AVS if appropriate.     KLAUDIA Brown

## 2022-09-28 NOTE — TELEPHONE ENCOUNTER
Hub staff attempted to follow warm transfer process and was unsuccessful     Caller: Nusrat Valdovinos    Relationship to patient: Self    Best call back number: [phone]    Patient is needing: PATIENT HAD A MISSED CALL. VM SAID TO CALL BACK. NO NOTES IN CHART.

## 2022-09-28 NOTE — ASSESSMENT & PLAN NOTE
Antibiotic as prescribed.  Keep clean with soap and water.  Risk discussed understood.  Patient states no risk chance of pregnancy.  Risk of meds discussed understood.  Return in 2 days if no improvement, sooner if worsens.  Return to clinic or ED with any issues or concerns.

## 2022-09-29 ENCOUNTER — TELEPHONE (OUTPATIENT)
Dept: FAMILY MEDICINE CLINIC | Facility: CLINIC | Age: 37
End: 2022-09-29

## 2022-09-29 RX ORDER — FLUCONAZOLE 150 MG/1
150 TABLET ORAL ONCE
Qty: 1 TABLET | Refills: 0 | Status: SHIPPED | OUTPATIENT
Start: 2022-09-29 | End: 2022-09-29

## 2022-09-29 NOTE — TELEPHONE ENCOUNTER
Patient wants to know if you can send over medication for yeast infection, states she usually always gets one when on antibiotics.

## 2022-10-03 ENCOUNTER — TREATMENT (OUTPATIENT)
Dept: PHYSICAL THERAPY | Facility: CLINIC | Age: 37
End: 2022-10-03

## 2022-10-03 DIAGNOSIS — M54.2 PAIN, NECK: ICD-10-CM

## 2022-10-03 DIAGNOSIS — G89.29 CHRONIC BILATERAL LOW BACK PAIN, UNSPECIFIED WHETHER SCIATICA PRESENT: Primary | ICD-10-CM

## 2022-10-03 DIAGNOSIS — M54.50 CHRONIC BILATERAL LOW BACK PAIN, UNSPECIFIED WHETHER SCIATICA PRESENT: Primary | ICD-10-CM

## 2022-10-03 PROCEDURE — 97140 MANUAL THERAPY 1/> REGIONS: CPT | Performed by: PHYSICAL THERAPIST

## 2022-10-03 PROCEDURE — 97110 THERAPEUTIC EXERCISES: CPT | Performed by: PHYSICAL THERAPIST

## 2022-10-03 NOTE — PROGRESS NOTES
Physical Therapy Progress Note    Patient: Nusrat Valdovinos   : 1985  Diagnosis/ICD-10 Code:  The primary encounter diagnosis was Chronic bilateral low back pain, unspecified whether sciatica present. A diagnosis of Pain, neck was also pertinent to this visit.   Referring practitioner: Debora Bailey PA-C  Date of Initial Visit: Type: THERAPY  Noted: 2022  Today's Date: 10/3/2022  Visit:  6     Nusrat Valdovinos reports: she fell 3 weeks ago.  She missed a step in the dark and landed on concrete on her L knee and her R shoulder.  She has had x-rays and nothing is broken.  She is anticipating possible MRI.  Her injuries have limited her ability to perform her HEP.  Her neck is bothering her today.  It feels better if she looks up. Pt says she was doing better until she fell 3 weeks ago.     Subjective     NDI: 38% (initially 48%)  Mod Oswestry: 26% (no baseline measure to compare)    Treatment  Pre-treatment pain:  5 (neck)  Post-treatment pain:  5    Exercise 1  Exercise Name 1: Brief reassessment  Exercise 2  Exercise Name 2: active CROM in sitting  Manual Rx 1  Manual Rx 1 Location: cervical spine  Manual Rx 1 Type: supine manual suboccipital release, gentle R to L sideglides    Functional Testing  Functional Tests Options: Modified [place]ry   Objective   Pt demonstrates normal CROM.      Assessment & Plan     Assessment    Assessment details: Pt has had new fall with injuries to L knee and R shoulder.  Her neck and low back symptoms do not seem to be exacerbated by the fall, but she has been unable to exercise regularly and has had altered gait pattern.  Complete reassessment is limited by new injuries.    Goals  Plan Goals: Plan Goals: 4 weeks   1) Pt demonstrates independence and compliance with initial HEP-MET.  2) Pt reports reduction in frequency, intensity, and distribution of intermittent extremity symptoms-new injury makes this difficult to assess.   3) Pain is decreased to no  worse than 5/10 during activity-MET.     8 weeks-ongoing  1) Pt demonstrates independence in advanced HEP and self-management strategies to prevent/reduce symptom recurrence.  2) Pt reports pain is not constant, is localized, is no worse than 3/10, and is relieved by performance of prescribed HEP.  3) Pt indicates improving tolerance for activity before onset of symptoms.  4) NDI score is improved to 30% or better, indicating improving functional abilities.    Plan  Plan details: Pt will follow up with MD regarding recent injuries.  She will call back to reschedule as needed.               Timed:  Manual Therapy:    10     mins  45664;  Therapeutic Exercise:    20     mins  92226;     Neuromuscular Parker:        mins  52462;    Therapeutic Activity:          mins  18808;     Gait Training:           mins  67793;     Ultrasound:          mins  57274;    Electrical Stimulation:         mins  57711 ( );    Untimed:  Electrical Stimulation:         mins  38146 ( );  Mechanical Traction:         mins  52381;     Timed Treatment:   30   mins   Total Treatment:     30   mins      Ramona Howard, PT  Physical Therapist

## 2022-11-02 ENCOUNTER — DOCUMENTATION (OUTPATIENT)
Dept: PHYSICAL THERAPY | Facility: CLINIC | Age: 37
End: 2022-11-02

## 2022-11-02 NOTE — PROGRESS NOTES
Discharge Summary  Discharge Summary from Physical Therapy Report      Patient: Nusrat Valdovinos   : 1985  Diagnosis/ICD-10 Code:  There were no encounter diagnoses.   Referring practitioner: No ref. provider found  Date of Initial Visit: No linked episodes  Today's Date: 2022  Date of Last Visit: 10-     Number of Visits: 6    Discharge Status of Patient: returning to MD for new injuries  Goals: Partially Met    Discharge Plan: Patient to return to referring/providing physician    Comments:  Pt has not rescheduled to return for treatment.    Date of Discharge: 2022        Ramona Howard, PT

## 2023-08-02 ENCOUNTER — OFFICE VISIT (OUTPATIENT)
Dept: FAMILY MEDICINE CLINIC | Facility: CLINIC | Age: 38
End: 2023-08-02
Payer: MEDICAID

## 2023-08-02 VITALS
OXYGEN SATURATION: 96 % | DIASTOLIC BLOOD PRESSURE: 90 MMHG | HEART RATE: 76 BPM | SYSTOLIC BLOOD PRESSURE: 120 MMHG | HEIGHT: 63 IN | BODY MASS INDEX: 45.54 KG/M2 | WEIGHT: 257 LBS

## 2023-08-02 DIAGNOSIS — Z00.00 ROUTINE MEDICAL EXAM: Primary | ICD-10-CM

## 2023-08-02 DIAGNOSIS — L98.9 SKIN LESION: ICD-10-CM

## 2023-08-02 DIAGNOSIS — E56.9 VITAMIN DEFICIENCY: ICD-10-CM

## 2023-08-02 DIAGNOSIS — R32 URINARY INCONTINENCE, UNSPECIFIED TYPE: ICD-10-CM

## 2023-08-02 DIAGNOSIS — Z13.220 SCREENING FOR LIPID DISORDERS: ICD-10-CM

## 2023-08-02 DIAGNOSIS — J30.1 SEASONAL ALLERGIC RHINITIS DUE TO POLLEN: ICD-10-CM

## 2023-08-02 DIAGNOSIS — Z13.1 SCREENING FOR DIABETES MELLITUS: ICD-10-CM

## 2023-08-02 DIAGNOSIS — E66.01 MORBID (SEVERE) OBESITY DUE TO EXCESS CALORIES: ICD-10-CM

## 2023-08-02 RX ORDER — MONTELUKAST SODIUM 10 MG/1
10 TABLET ORAL NIGHTLY
Qty: 90 TABLET | Refills: 1 | Status: SHIPPED | OUTPATIENT
Start: 2023-08-02

## 2023-08-02 RX ORDER — ALBUTEROL SULFATE 90 UG/1
2 AEROSOL, METERED RESPIRATORY (INHALATION) EVERY 4 HOURS PRN
Qty: 18 G | Refills: 2 | Status: SHIPPED | OUTPATIENT
Start: 2023-08-02

## 2023-08-03 LAB
25(OH)D3+25(OH)D2 SERPL-MCNC: 28.9 NG/ML (ref 30–100)
ALBUMIN SERPL-MCNC: 4.2 G/DL (ref 3.9–4.9)
ALBUMIN/GLOB SERPL: 1.8 {RATIO} (ref 1.2–2.2)
ALP SERPL-CCNC: 107 IU/L (ref 44–121)
ALT SERPL-CCNC: 17 IU/L (ref 0–32)
AST SERPL-CCNC: 19 IU/L (ref 0–40)
BASOPHILS # BLD AUTO: 0.1 X10E3/UL (ref 0–0.2)
BASOPHILS NFR BLD AUTO: 1 %
BILIRUB SERPL-MCNC: 0.5 MG/DL (ref 0–1.2)
BUN SERPL-MCNC: 10 MG/DL (ref 6–20)
BUN/CREAT SERPL: 12 (ref 9–23)
CALCIUM SERPL-MCNC: 9.3 MG/DL (ref 8.7–10.2)
CHLORIDE SERPL-SCNC: 102 MMOL/L (ref 96–106)
CHOLEST SERPL-MCNC: 215 MG/DL (ref 100–199)
CO2 SERPL-SCNC: 25 MMOL/L (ref 20–29)
CREAT SERPL-MCNC: 0.82 MG/DL (ref 0.57–1)
EGFRCR SERPLBLD CKD-EPI 2021: 94 ML/MIN/1.73
EOSINOPHIL # BLD AUTO: 0.4 X10E3/UL (ref 0–0.4)
EOSINOPHIL NFR BLD AUTO: 5 %
ERYTHROCYTE [DISTWIDTH] IN BLOOD BY AUTOMATED COUNT: 13.6 % (ref 11.7–15.4)
FOLATE SERPL-MCNC: 4.6 NG/ML
GLOBULIN SER CALC-MCNC: 2.4 G/DL (ref 1.5–4.5)
GLUCOSE SERPL-MCNC: 99 MG/DL (ref 70–99)
HBA1C MFR BLD: 5.5 % (ref 4.8–5.6)
HCT VFR BLD AUTO: 42.1 % (ref 34–46.6)
HDLC SERPL-MCNC: 51 MG/DL
HGB BLD-MCNC: 13.2 G/DL (ref 11.1–15.9)
IMM GRANULOCYTES # BLD AUTO: 0.1 X10E3/UL (ref 0–0.1)
IMM GRANULOCYTES NFR BLD AUTO: 1 %
LDLC SERPL CALC-MCNC: 130 MG/DL (ref 0–99)
LYMPHOCYTES # BLD AUTO: 2.4 X10E3/UL (ref 0.7–3.1)
LYMPHOCYTES NFR BLD AUTO: 30 %
MCH RBC QN AUTO: 28.6 PG (ref 26.6–33)
MCHC RBC AUTO-ENTMCNC: 31.4 G/DL (ref 31.5–35.7)
MCV RBC AUTO: 91 FL (ref 79–97)
MONOCYTES # BLD AUTO: 0.7 X10E3/UL (ref 0.1–0.9)
MONOCYTES NFR BLD AUTO: 8 %
NEUTROPHILS # BLD AUTO: 4.3 X10E3/UL (ref 1.4–7)
NEUTROPHILS NFR BLD AUTO: 55 %
PLATELET # BLD AUTO: 321 X10E3/UL (ref 150–450)
POTASSIUM SERPL-SCNC: 4.8 MMOL/L (ref 3.5–5.2)
PROT SERPL-MCNC: 6.6 G/DL (ref 6–8.5)
RBC # BLD AUTO: 4.61 X10E6/UL (ref 3.77–5.28)
SODIUM SERPL-SCNC: 140 MMOL/L (ref 134–144)
TRIGL SERPL-MCNC: 190 MG/DL (ref 0–149)
TSH SERPL DL<=0.005 MIU/L-ACNC: 1.92 UIU/ML (ref 0.45–4.5)
VIT B12 SERPL-MCNC: 356 PG/ML (ref 232–1245)
VLDLC SERPL CALC-MCNC: 34 MG/DL (ref 5–40)
WBC # BLD AUTO: 7.9 X10E3/UL (ref 3.4–10.8)

## 2023-11-01 ENCOUNTER — TELEPHONE (OUTPATIENT)
Dept: FAMILY MEDICINE CLINIC | Facility: CLINIC | Age: 38
End: 2023-11-01
Payer: MEDICAID

## 2023-11-01 NOTE — TELEPHONE ENCOUNTER
HUB TO RELAY     This was sent to [facility]. They dont run through insurance so it doesn't require a PA when self paying.

## 2023-11-01 NOTE — TELEPHONE ENCOUNTER
Caller: Nusrat Valdovinos    Relationship to patient: Self    Best call back number: [phone]     Patient is needing: PATIENT STATES SHE WAS PRESCRIBED SEMAGLUTIDE. SHE STATES SHE SPOKE WITH INSURANCE AND THEY TOLD HER THEY WOULD APPROVE WITH A PRIORI AUTHORIZATION.     Semaglutide-Weight Management 1 MG/0.5ML solution auto-injector

## 2024-06-06 ENCOUNTER — OFFICE VISIT (OUTPATIENT)
Dept: FAMILY MEDICINE CLINIC | Facility: CLINIC | Age: 39
End: 2024-06-06
Payer: MEDICAID

## 2024-06-06 VITALS
WEIGHT: 252 LBS | HEART RATE: 91 BPM | HEIGHT: 63 IN | SYSTOLIC BLOOD PRESSURE: 110 MMHG | OXYGEN SATURATION: 96 % | BODY MASS INDEX: 44.65 KG/M2 | DIASTOLIC BLOOD PRESSURE: 80 MMHG

## 2024-06-06 DIAGNOSIS — Z72.0 TOBACCO USE: ICD-10-CM

## 2024-06-06 DIAGNOSIS — E66.01 MORBID (SEVERE) OBESITY DUE TO EXCESS CALORIES: ICD-10-CM

## 2024-06-06 DIAGNOSIS — M25.561 ACUTE PAIN OF RIGHT KNEE: Primary | ICD-10-CM

## 2024-06-06 PROCEDURE — 1160F RVW MEDS BY RX/DR IN RCRD: CPT | Performed by: NURSE PRACTITIONER

## 2024-06-06 PROCEDURE — 1159F MED LIST DOCD IN RCRD: CPT | Performed by: NURSE PRACTITIONER

## 2024-06-06 PROCEDURE — 99213 OFFICE O/P EST LOW 20 MIN: CPT | Performed by: NURSE PRACTITIONER

## 2024-06-06 RX ORDER — PREDNISONE 20 MG/1
TABLET ORAL
Qty: 18 TABLET | Refills: 0 | Status: SHIPPED | OUTPATIENT
Start: 2024-06-06 | End: 2024-06-15

## 2024-06-06 NOTE — ASSESSMENT & PLAN NOTE
X-ray completed.  Will let know if radiologist sees anything.  RICE encouraged.  We will try course of prednisone.  Avoid NSAIDs while on prednisone.  Patient states no risk or chance of pregnancy.  Risk of meds discussed and understood.  Education provided.  Return in 1 week if no improvement, sooner if worsens.  Return to clinic or ED with any issues or concerns.

## 2024-06-06 NOTE — PROGRESS NOTES
"Chief Complaint  Knee Pain (Right knee pain for 2 weeks )    Subjective          Nusrat Valdovinos presents to [facility]  Knee Pain   Pertinent negatives include no numbness.     Patient states since May 17 she has had right knee pain mainly anterior lower knee and right medial knee pain.  No redness no warmth.  States mild swelling at times.  No obvious injury.  States she does have a history of a bad knee and states she believes she possibly had a slight tear in her meniscus years ago but is unsure.  States pain with walking and bending.  She states at times it does feel like her knee could give out and buckle.  No calf pain no leg swelling.    Objective   Vital Signs:   /80   Pulse 91   Ht 160 cm (63\")   Wt 114 kg (252 lb)   SpO2 96%   BMI 44.64 kg/m²     Body mass index is 44.64 kg/m².    Review of Systems   Constitutional:  Negative for chills, fatigue and fever.   Respiratory:  Negative for cough and shortness of breath.    Cardiovascular:  Negative for chest pain and leg swelling.   Genitourinary:  Negative for dysuria.   Musculoskeletal:  Positive for arthralgias. Negative for joint swelling.   Skin:  Negative for color change, rash, skin lesions and wound.   Neurological:  Negative for weakness, numbness and headache.       Past History:  Medical History: has a past medical history of Backache, Common migraine, Fibromyalgia, Headache (1998), History of Chiari malformation, Low back pain (2008), MRSA (methicillin resistant Staphylococcus aureus) (4/2009), PCOS (polycystic ovarian syndrome), and Seizures (2017).   Surgical History: has a past surgical history that includes Breast biopsy; Brain surgery (10/2017); Tonsillectomy; and Hysterectomy (05/2020).   Family History: family history includes Alcohol abuse in her father; Allergies in her mother and another family member; Anxiety disorder in her daughter and maternal aunt; Arthritis in her maternal aunt, " maternal grandfather, maternal grandmother, and mother; Asthma in her maternal aunt, maternal grandfather, mother, and another family member; Breast cancer in her mother and paternal aunt; Cancer in her mother; Depression in her daughter and maternal aunt; Diabetes in her maternal grandfather, mother, and another family member; Drug abuse in her father; Early death in her mother; Hyperlipidemia in her maternal grandfather and mother; Mental illness in her daughter, mother, and another family member; Migraines in her mother; Other in her mother; Ovarian cancer in her maternal grandmother.   Social History: reports that she quit smoking about 22 months ago. Her smoking use included cigarettes. She started smoking about 19 years ago. She has a 8.8 pack-year smoking history. She has never used smokeless tobacco. She reports current alcohol use of about 3.0 standard drinks of alcohol per week. She reports that she does not use drugs.    PHQ-2 Depression Screening  Little interest or pleasure in doing things? 0-->not at all   Feeling down, depressed, or hopeless? 0-->not at all   PHQ-2 Total Score 0        PHQ-9 Depression Screening  Little interest or pleasure in doing things? 0-->not at all   Feeling down, depressed, or hopeless? 0-->not at all   Trouble falling or staying asleep, or sleeping too much?     Feeling tired or having little energy?     Poor appetite or overeating?     Feeling bad about yourself - or that you are a failure or have let yourself or your family down?     Trouble concentrating on things, such as reading the newspaper or watching television?     Moving or speaking so slowly that other people could have noticed? Or the opposite - being so fidgety or restless that you have been moving around a lot more than usual?     Thoughts that you would be better off dead, or of hurting yourself in some way?     PHQ-9 Total Score 0   If you checked off any problems, how difficult have these problems made it  for you to do your work, take care of things at home, or get along with other people?       PHQ-9 Total Score: 0      Patient screened positive for depression based on a PHQ-9 score of 0 on 6/6/2024. Follow-up recommendations include:          Current Outpatient Medications:     albuterol sulfate  (90 Base) MCG/ACT inhaler, Inhale 2 puffs Every 4 (Four) Hours As Needed for Wheezing., Disp: 18 g, Rfl: 2    ibuprofen (ADVIL,MOTRIN) 200 MG tablet, Take 1 tablet by mouth Every 6 (Six) Hours As Needed for Mild Pain., Disp: , Rfl:     montelukast (Singulair) 10 MG tablet, Take 1 tablet by mouth Every Night., Disp: 90 tablet, Rfl: 1    Semaglutide-Weight Management 1 MG/0.5ML solution auto-injector, Inject 0.5 mL under the skin into the appropriate area as directed 1 (One) Time Per Week. Please compound 1mg/1ml. 0.25mg SC weekly x 4 weeks, 0.5mg weekly x 4 weeks, 1 mg weekly thereafter, Disp: 10 mL, Rfl: 5    meclizine (ANTIVERT) 25 MG tablet, Take 1 tablet by mouth 3 (Three) Times a Day As Needed for Dizziness., Disp: 30 tablet, Rfl: 0    predniSONE (DELTASONE) 20 MG tablet, Take 3 tablets by mouth Daily for 3 days, THEN 2 tablets Daily for 3 days, THEN 1 tablet Daily for 3 days., Disp: 18 tablet, Rfl: 0   (Not in a hospital admission)     Allergies: Cipro [ciprofloxacin hcl] and Latex    Physical Exam  Constitutional:       Appearance: Normal appearance.   Cardiovascular:      Rate and Rhythm: Normal rate and regular rhythm.      Heart sounds: Normal heart sounds.   Pulmonary:      Effort: Pulmonary effort is normal.      Breath sounds: Normal breath sounds.   Musculoskeletal:      Right knee: No swelling, erythema, ecchymosis, bony tenderness or crepitus. Normal range of motion. Tenderness present. Normal alignment. Normal pulse.      Right lower leg: No edema.      Left lower leg: No edema.      Comments: Tenderness present to anterior lower knee and medial knee (right knee).     No calf pain. No leg swelling.  No redness.    Skin:     General: Skin is warm.      Findings: No erythema or rash.   Neurological:      General: No focal deficit present.      Mental Status: She is alert and oriented to person, place, and time. Mental status is at baseline.   Psychiatric:         Mood and Affect: Mood normal.         Behavior: Behavior normal.         Thought Content: Thought content normal.         Judgment: Judgment normal.          Result Review :                   Assessment and Plan    Diagnoses and all orders for this visit:    1. Acute pain of right knee (Primary)  Assessment & Plan:  X-ray completed.  Will let know if radiologist sees anything.  RICE encouraged.  We will try course of prednisone.  Avoid NSAIDs while on prednisone.  Patient states no risk or chance of pregnancy.  Risk of meds discussed and understood.  Education provided.  Return in 1 week if no improvement, sooner if worsens.  Return to clinic or ED with any issues or concerns.    Orders:  -     XR Knee 1 or 2 View Right (In Office)  -     predniSONE (DELTASONE) 20 MG tablet; Take 3 tablets by mouth Daily for 3 days, THEN 2 tablets Daily for 3 days, THEN 1 tablet Daily for 3 days.  Dispense: 18 tablet; Refill: 0    2. Tobacco use                      Follow Up   Return if symptoms worsen or fail to improve.  Patient was given instructions and counseling regarding her condition or for health maintenance advice. Please see specific information pulled into the AVS if appropriate.     KLAUDIA Brown

## 2024-06-07 NOTE — TELEPHONE ENCOUNTER
It's been almost a year since I've seen her for this. She needs an appt. Let's schedule her with Allan nam he can write this also. Thanks!

## 2024-06-07 NOTE — TELEPHONE ENCOUNTER
HUB TO RELAY: pt needs to be scheduled with provider to discuss medication. Please get her scheduled.       LVM for pt to call and be scheduled for med recheck.

## 2024-06-11 ENCOUNTER — OFFICE VISIT (OUTPATIENT)
Dept: FAMILY MEDICINE CLINIC | Facility: CLINIC | Age: 39
End: 2024-06-11
Payer: MEDICAID

## 2024-06-11 VITALS
HEIGHT: 63 IN | DIASTOLIC BLOOD PRESSURE: 88 MMHG | OXYGEN SATURATION: 97 % | SYSTOLIC BLOOD PRESSURE: 134 MMHG | BODY MASS INDEX: 44.36 KG/M2 | WEIGHT: 250.38 LBS | HEART RATE: 97 BPM

## 2024-06-11 DIAGNOSIS — Z13.220 SCREENING CHOLESTEROL LEVEL: ICD-10-CM

## 2024-06-11 DIAGNOSIS — E55.9 VITAMIN D DEFICIENCY: ICD-10-CM

## 2024-06-11 DIAGNOSIS — I10 BENIGN ESSENTIAL HTN: ICD-10-CM

## 2024-06-11 DIAGNOSIS — Z13.1 SCREENING FOR DIABETES MELLITUS: ICD-10-CM

## 2024-06-11 DIAGNOSIS — E66.01 CLASS 3 SEVERE OBESITY DUE TO EXCESS CALORIES WITH BODY MASS INDEX (BMI) OF 40.0 TO 44.9 IN ADULT, UNSPECIFIED WHETHER SERIOUS COMORBIDITY PRESENT: Primary | ICD-10-CM

## 2024-06-11 PROCEDURE — 1160F RVW MEDS BY RX/DR IN RCRD: CPT | Performed by: NURSE PRACTITIONER

## 2024-06-11 PROCEDURE — 3079F DIAST BP 80-89 MM HG: CPT | Performed by: NURSE PRACTITIONER

## 2024-06-11 PROCEDURE — 1159F MED LIST DOCD IN RCRD: CPT | Performed by: NURSE PRACTITIONER

## 2024-06-11 PROCEDURE — 3075F SYST BP GE 130 - 139MM HG: CPT | Performed by: NURSE PRACTITIONER

## 2024-06-11 PROCEDURE — 99214 OFFICE O/P EST MOD 30 MIN: CPT | Performed by: NURSE PRACTITIONER

## 2024-06-11 RX ORDER — SEMAGLUTIDE 0.25 MG/.5ML
0.25 INJECTION, SOLUTION SUBCUTANEOUS WEEKLY
Qty: 2 ML | Refills: 0 | Status: SHIPPED | OUTPATIENT
Start: 2024-06-11

## 2024-06-11 RX ORDER — METHYLPREDNISOLONE 4 MG/1
TABLET ORAL
COMMUNITY
End: 2024-06-11

## 2024-06-11 NOTE — PROGRESS NOTES
"Chief Complaint  weight management    Subjective          Nusrat Valdovinos presents to [facility]  History of Present Illness    Patient states through diet and exercise she has been struggling to lose weight.  States she believes Wegovy will be covered with a PA so would like to try and get started on that.  States no risk or chance of pregnancy.  States no personal or family history of thyroid cancer.    She is not fasting today but states she would like to have blood work completed and so she will return in the morning for that.  No dizziness no headache no chest pain no chest pressure no shortness of breath no trouble breathing no urinary or bowel issues.  Overall states she is doing well.    Objective   Vital Signs:   /88   Pulse 97   Ht 160 cm (63\")   Wt 114 kg (250 lb 6 oz)   SpO2 97%   BMI 44.35 kg/m²     Body mass index is 44.35 kg/m².    Review of Systems   Constitutional:  Negative for chills, fatigue and fever.   HENT:  Negative for congestion.    Eyes:  Negative for visual disturbance.   Respiratory:  Negative for cough, shortness of breath and wheezing.    Cardiovascular: Negative.    Gastrointestinal:  Negative for abdominal pain, constipation, diarrhea, nausea and vomiting.   Genitourinary:  Negative for decreased urine volume, dysuria, frequency, hematuria and urgency.   Musculoskeletal:  Negative for back pain.   Neurological:  Negative for headache.   Psychiatric/Behavioral:  Negative for suicidal ideas.        Past History:  Medical History: has a past medical history of Backache, Common migraine, Fibromyalgia, Headache (1998), History of Chiari malformation, Low back pain (2008), MRSA (methicillin resistant Staphylococcus aureus) (4/2009), PCOS (polycystic ovarian syndrome), Screening for diabetes mellitus (6/11/2024), and Seizures (2017).   Surgical History: has a past surgical history that includes Breast biopsy; Brain surgery (10/2017); " Tonsillectomy; and Hysterectomy (05/2020).   Family History: family history includes Alcohol abuse in her father; Allergies in her mother and another family member; Anxiety disorder in her daughter and maternal aunt; Arthritis in her maternal aunt, maternal grandfather, maternal grandmother, and mother; Asthma in her maternal aunt, maternal grandfather, mother, and another family member; Breast cancer in her mother and paternal aunt; Cancer in her mother; Depression in her daughter and maternal aunt; Diabetes in her maternal grandfather, mother, and another family member; Drug abuse in her father; Early death in her mother; Hyperlipidemia in her maternal grandfather and mother; Mental illness in her daughter, mother, and another family member; Migraines in her mother; Other in her mother; Ovarian cancer in her maternal grandmother.   Social History: reports that she has been smoking cigarettes. She started smoking about 19 years ago. She has a 8.8 pack-year smoking history. She has never used smokeless tobacco. She reports current alcohol use of about 3.0 standard drinks of alcohol per week. She reports that she does not use drugs.    PHQ-2 Depression Screening  Little interest or pleasure in doing things?     Feeling down, depressed, or hopeless?     PHQ-2 Total Score          PHQ-9 Depression Screening  Little interest or pleasure in doing things?     Feeling down, depressed, or hopeless?     Trouble falling or staying asleep, or sleeping too much?     Feeling tired or having little energy?     Poor appetite or overeating?     Feeling bad about yourself - or that you are a failure or have let yourself or your family down?     Trouble concentrating on things, such as reading the newspaper or watching television?     Moving or speaking so slowly that other people could have noticed? Or the opposite - being so fidgety or restless that you have been moving around a lot more than usual?     Thoughts that you would be  better off dead, or of hurting yourself in some way?     PHQ-9 Total Score     If you checked off any problems, how difficult have these problems made it for you to do your work, take care of things at home, or get along with other people?       PHQ-9 Total Score:        Patient screened positive for depression based on a PHQ-9 score of 0 on 6/6/2024. Follow-up recommendations include:          Current Outpatient Medications:     albuterol sulfate  (90 Base) MCG/ACT inhaler, Inhale 2 puffs Every 4 (Four) Hours As Needed for Wheezing., Disp: 18 g, Rfl: 2    ibuprofen (ADVIL,MOTRIN) 200 MG tablet, Take 1 tablet by mouth Every 6 (Six) Hours As Needed for Mild Pain., Disp: , Rfl:     meclizine (ANTIVERT) 25 MG tablet, Take 1 tablet by mouth 3 (Three) Times a Day As Needed for Dizziness., Disp: 30 tablet, Rfl: 0    montelukast (Singulair) 10 MG tablet, Take 1 tablet by mouth Every Night., Disp: 90 tablet, Rfl: 1    predniSONE (DELTASONE) 20 MG tablet, Take 3 tablets by mouth Daily for 3 days, THEN 2 tablets Daily for 3 days, THEN 1 tablet Daily for 3 days., Disp: 18 tablet, Rfl: 0    Semaglutide-Weight Management (Wegovy) 0.25 MG/0.5ML solution auto-injector, Inject 0.5 mL under the skin into the appropriate area as directed 1 (One) Time Per Week. Call for next dose, Disp: 2 mL, Rfl: 0   (Not in a hospital admission)     Allergies: Cipro [ciprofloxacin hcl] and Latex    Physical Exam  Constitutional:       Appearance: Normal appearance.   Eyes:      Conjunctiva/sclera: Conjunctivae normal.      Pupils: Pupils are equal, round, and reactive to light.   Cardiovascular:      Rate and Rhythm: Normal rate and regular rhythm.      Heart sounds: Normal heart sounds.   Pulmonary:      Effort: Pulmonary effort is normal.      Breath sounds: Normal breath sounds.   Neurological:      General: No focal deficit present.      Mental Status: She is alert and oriented to person, place, and time. Mental status is at baseline.    Psychiatric:         Mood and Affect: Mood normal.         Behavior: Behavior normal.         Thought Content: Thought content normal.         Judgment: Judgment normal.          Result Review :                   Assessment and Plan    Diagnoses and all orders for this visit:    1. Class 3 severe obesity due to excess calories with body mass index (BMI) of 40.0 to 44.9 in adult, unspecified whether serious comorbidity present (Primary)  -     Semaglutide-Weight Management (Wegovy) 0.25 MG/0.5ML solution auto-injector; Inject 0.5 mL under the skin into the appropriate area as directed 1 (One) Time Per Week. Call for next dose  Dispense: 2 mL; Refill: 0    2. Screening for diabetes mellitus  -     Hemoglobin A1c    3. Benign essential HTN  -     CBC & Differential  -     Comprehensive Metabolic Panel  -     TSH Rfx On Abnormal To Free T4  -     Lipid Panel    4. Screening cholesterol level  -     Lipid Panel    5. Vitamin D deficiency  -     Vitamin D,25-Hydroxy    Patient will return tomorrow for fasting labs.  Goal blood pressure less than 140/90.  Let me know if gets 2 or above that.  Proper diet and exercise plan discussed and encouraged.  We will try Wegovy.  Patient states no personal or family history of thyroid cancer.  Patient states no risk or chance of pregnancy.  Risk of meds discussed and understood.  Education provided.  Return to clinic or ED with any issues or concerns.                    Follow Up   Return in about 3 months (around 9/11/2024).  Patient was given instructions and counseling regarding her condition or for health maintenance advice. Please see specific information pulled into the AVS if appropriate.     KLAUDIA Brown

## 2024-06-18 ENCOUNTER — LAB (OUTPATIENT)
Dept: FAMILY MEDICINE CLINIC | Facility: CLINIC | Age: 39
End: 2024-06-18
Payer: MEDICAID

## 2024-06-19 LAB
25(OH)D3+25(OH)D2 SERPL-MCNC: 26 NG/ML (ref 30–100)
ALBUMIN SERPL-MCNC: 4 G/DL (ref 3.9–4.9)
ALP SERPL-CCNC: 110 IU/L (ref 44–121)
ALT SERPL-CCNC: 11 IU/L (ref 0–32)
AST SERPL-CCNC: 12 IU/L (ref 0–40)
BASOPHILS # BLD AUTO: 0.1 X10E3/UL (ref 0–0.2)
BASOPHILS NFR BLD AUTO: 0 %
BILIRUB SERPL-MCNC: 0.3 MG/DL (ref 0–1.2)
BUN SERPL-MCNC: 11 MG/DL (ref 6–20)
BUN/CREAT SERPL: 12 (ref 9–23)
CALCIUM SERPL-MCNC: 9.2 MG/DL (ref 8.7–10.2)
CHLORIDE SERPL-SCNC: 103 MMOL/L (ref 96–106)
CHOLEST SERPL-MCNC: 202 MG/DL (ref 100–199)
CO2 SERPL-SCNC: 24 MMOL/L (ref 20–29)
CREAT SERPL-MCNC: 0.91 MG/DL (ref 0.57–1)
EGFRCR SERPLBLD CKD-EPI 2021: 82 ML/MIN/1.73
EOSINOPHIL # BLD AUTO: 0.5 X10E3/UL (ref 0–0.4)
EOSINOPHIL NFR BLD AUTO: 4 %
ERYTHROCYTE [DISTWIDTH] IN BLOOD BY AUTOMATED COUNT: 13.5 % (ref 11.7–15.4)
GLOBULIN SER CALC-MCNC: 2.4 G/DL (ref 1.5–4.5)
GLUCOSE SERPL-MCNC: 94 MG/DL (ref 70–99)
HBA1C MFR BLD: 5.7 % (ref 4.8–5.6)
HCT VFR BLD AUTO: 43.1 % (ref 34–46.6)
HDLC SERPL-MCNC: 48 MG/DL
HGB BLD-MCNC: 13.7 G/DL (ref 11.1–15.9)
IMM GRANULOCYTES # BLD AUTO: 0.1 X10E3/UL (ref 0–0.1)
IMM GRANULOCYTES NFR BLD AUTO: 1 %
LDLC SERPL CALC-MCNC: 121 MG/DL (ref 0–99)
LYMPHOCYTES # BLD AUTO: 3.9 X10E3/UL (ref 0.7–3.1)
LYMPHOCYTES NFR BLD AUTO: 34 %
MCH RBC QN AUTO: 29 PG (ref 26.6–33)
MCHC RBC AUTO-ENTMCNC: 31.8 G/DL (ref 31.5–35.7)
MCV RBC AUTO: 91 FL (ref 79–97)
MONOCYTES # BLD AUTO: 0.8 X10E3/UL (ref 0.1–0.9)
MONOCYTES NFR BLD AUTO: 7 %
NEUTROPHILS # BLD AUTO: 6.2 X10E3/UL (ref 1.4–7)
NEUTROPHILS NFR BLD AUTO: 54 %
PLATELET # BLD AUTO: 325 X10E3/UL (ref 150–450)
POTASSIUM SERPL-SCNC: 4.7 MMOL/L (ref 3.5–5.2)
PROT SERPL-MCNC: 6.4 G/DL (ref 6–8.5)
RBC # BLD AUTO: 4.73 X10E6/UL (ref 3.77–5.28)
SODIUM SERPL-SCNC: 140 MMOL/L (ref 134–144)
TRIGL SERPL-MCNC: 189 MG/DL (ref 0–149)
TSH SERPL DL<=0.005 MIU/L-ACNC: 2.68 UIU/ML (ref 0.45–4.5)
VLDLC SERPL CALC-MCNC: 33 MG/DL (ref 5–40)
WBC # BLD AUTO: 11.6 X10E3/UL (ref 3.4–10.8)

## 2024-06-20 DIAGNOSIS — M25.561 RIGHT KNEE PAIN, UNSPECIFIED CHRONICITY: Primary | ICD-10-CM

## 2024-06-25 DIAGNOSIS — J30.1 SEASONAL ALLERGIC RHINITIS DUE TO POLLEN: ICD-10-CM

## 2024-06-27 RX ORDER — MONTELUKAST SODIUM 10 MG/1
10 TABLET ORAL NIGHTLY
Qty: 90 TABLET | Refills: 1 | Status: SHIPPED | OUTPATIENT
Start: 2024-06-27

## 2024-07-03 DIAGNOSIS — D72.829 LEUKOCYTOSIS, UNSPECIFIED TYPE: Primary | ICD-10-CM

## 2024-07-05 ENCOUNTER — LAB (OUTPATIENT)
Dept: FAMILY MEDICINE CLINIC | Facility: CLINIC | Age: 39
End: 2024-07-05
Payer: MEDICAID

## 2024-07-06 LAB
BASOPHILS # BLD AUTO: 0.1 X10E3/UL (ref 0–0.2)
BASOPHILS NFR BLD AUTO: 1 %
EOSINOPHIL # BLD AUTO: 0.5 X10E3/UL (ref 0–0.4)
EOSINOPHIL NFR BLD AUTO: 5 %
ERYTHROCYTE [DISTWIDTH] IN BLOOD BY AUTOMATED COUNT: 13.5 % (ref 11.7–15.4)
HCT VFR BLD AUTO: 44.1 % (ref 34–46.6)
HGB BLD-MCNC: 14.1 G/DL (ref 11.1–15.9)
IMM GRANULOCYTES # BLD AUTO: 0 X10E3/UL (ref 0–0.1)
IMM GRANULOCYTES NFR BLD AUTO: 0 %
LYMPHOCYTES # BLD AUTO: 3.8 X10E3/UL (ref 0.7–3.1)
LYMPHOCYTES NFR BLD AUTO: 37 %
MCH RBC QN AUTO: 28.9 PG (ref 26.6–33)
MCHC RBC AUTO-ENTMCNC: 32 G/DL (ref 31.5–35.7)
MCV RBC AUTO: 90 FL (ref 79–97)
MONOCYTES # BLD AUTO: 0.9 X10E3/UL (ref 0.1–0.9)
MONOCYTES NFR BLD AUTO: 9 %
NEUTROPHILS # BLD AUTO: 5.2 X10E3/UL (ref 1.4–7)
NEUTROPHILS NFR BLD AUTO: 48 %
PLATELET # BLD AUTO: 371 X10E3/UL (ref 150–450)
RBC # BLD AUTO: 4.88 X10E6/UL (ref 3.77–5.28)
WBC # BLD AUTO: 10.5 X10E3/UL (ref 3.4–10.8)

## 2024-07-08 DIAGNOSIS — R79.89 ABNORMAL CBC: Primary | ICD-10-CM

## 2024-07-22 ENCOUNTER — LAB (OUTPATIENT)
Dept: FAMILY MEDICINE CLINIC | Facility: CLINIC | Age: 39
End: 2024-07-22
Payer: MEDICAID

## 2024-07-23 LAB
BASOPHILS # BLD AUTO: 0.1 X10E3/UL (ref 0–0.2)
BASOPHILS NFR BLD AUTO: 1 %
EOSINOPHIL # BLD AUTO: 0.5 X10E3/UL (ref 0–0.4)
EOSINOPHIL NFR BLD AUTO: 4 %
ERYTHROCYTE [DISTWIDTH] IN BLOOD BY AUTOMATED COUNT: 13.3 % (ref 11.7–15.4)
HCT VFR BLD AUTO: 41.7 % (ref 34–46.6)
HGB BLD-MCNC: 13.1 G/DL (ref 11.1–15.9)
IMM GRANULOCYTES # BLD AUTO: 0 X10E3/UL (ref 0–0.1)
IMM GRANULOCYTES NFR BLD AUTO: 0 %
LYMPHOCYTES # BLD AUTO: 3.5 X10E3/UL (ref 0.7–3.1)
LYMPHOCYTES NFR BLD AUTO: 34 %
MCH RBC QN AUTO: 28.9 PG (ref 26.6–33)
MCHC RBC AUTO-ENTMCNC: 31.4 G/DL (ref 31.5–35.7)
MCV RBC AUTO: 92 FL (ref 79–97)
MONOCYTES # BLD AUTO: 0.8 X10E3/UL (ref 0.1–0.9)
MONOCYTES NFR BLD AUTO: 8 %
NEUTROPHILS # BLD AUTO: 5.3 X10E3/UL (ref 1.4–7)
NEUTROPHILS NFR BLD AUTO: 53 %
PLATELET # BLD AUTO: 302 X10E3/UL (ref 150–450)
RBC # BLD AUTO: 4.53 X10E6/UL (ref 3.77–5.28)
WBC # BLD AUTO: 10.2 X10E3/UL (ref 3.4–10.8)

## 2024-07-30 ENCOUNTER — OFFICE VISIT (OUTPATIENT)
Dept: ORTHOPEDIC SURGERY | Facility: CLINIC | Age: 39
End: 2024-07-30
Payer: MEDICAID

## 2024-07-30 VITALS
WEIGHT: 250 LBS | DIASTOLIC BLOOD PRESSURE: 76 MMHG | HEIGHT: 63 IN | SYSTOLIC BLOOD PRESSURE: 118 MMHG | BODY MASS INDEX: 44.3 KG/M2

## 2024-07-30 DIAGNOSIS — M17.11 PRIMARY OSTEOARTHRITIS OF RIGHT KNEE: Primary | ICD-10-CM

## 2024-07-30 DIAGNOSIS — M25.461 EFFUSION OF RIGHT KNEE: ICD-10-CM

## 2024-07-30 PROCEDURE — 99204 OFFICE O/P NEW MOD 45 MIN: CPT | Performed by: STUDENT IN AN ORGANIZED HEALTH CARE EDUCATION/TRAINING PROGRAM

## 2024-07-30 PROCEDURE — 3074F SYST BP LT 130 MM HG: CPT | Performed by: STUDENT IN AN ORGANIZED HEALTH CARE EDUCATION/TRAINING PROGRAM

## 2024-07-30 PROCEDURE — 1159F MED LIST DOCD IN RCRD: CPT | Performed by: STUDENT IN AN ORGANIZED HEALTH CARE EDUCATION/TRAINING PROGRAM

## 2024-07-30 PROCEDURE — 1160F RVW MEDS BY RX/DR IN RCRD: CPT | Performed by: STUDENT IN AN ORGANIZED HEALTH CARE EDUCATION/TRAINING PROGRAM

## 2024-07-30 PROCEDURE — 3078F DIAST BP <80 MM HG: CPT | Performed by: STUDENT IN AN ORGANIZED HEALTH CARE EDUCATION/TRAINING PROGRAM

## 2024-07-30 RX ORDER — DICLOFENAC SODIUM 75 MG/1
75 TABLET, DELAYED RELEASE ORAL 2 TIMES DAILY WITH MEALS
Qty: 60 TABLET | Refills: 1 | Status: SHIPPED | OUTPATIENT
Start: 2024-07-30

## 2024-07-30 NOTE — PROGRESS NOTES
[facility] [place] [facility] Office Visit     Office Visit       Date: 07/30/2024   Patient Name: Nusrat Valdovinos  MRN: [id number]  YOB: 1985    Referring Physician: Allan De Anda AP*     Chief Complaint:   Chief Complaint   Patient presents with    Right Knee - Pain       History of Present Illness:   Nusrat Valdovinos is a 39 y.o. female who presents with right knee pain for 2 month(s). Onset atraumatic and gradual in nature. Pain is localized to the anterior knee and is a 6/10 on the pain scale. Pain is described as burning, throbbing, stabbing, and shooting. Associated symptoms include pain, swelling, and popping. The pain is worse with any movement of the joint; lying down and elevating the extremity make it better. Previous treatments have included: NSAIDS and oral steroids since symptom onset. Although some transient relief was reported with these interventions, these conservative measures have failed and symptoms have persisted. The patient is limited in daily activities and has had a significant decrease in quality of life as a result. She denies fevers, chills, or constitutional symptoms.    Subjective   Review of Systems: Review of Systems   Constitutional:  Negative for chills, fever, unexpected weight gain and unexpected weight loss.   HENT:  Negative for congestion, postnasal drip and rhinorrhea.    Eyes:  Negative for blurred vision.   Respiratory:  Negative for shortness of breath.    Cardiovascular:  Negative for leg swelling.   Gastrointestinal:  Negative for abdominal pain, nausea and vomiting.   Genitourinary:  Negative for difficulty urinating.   Musculoskeletal:  Positive for arthralgias. Negative for gait problem, joint swelling and myalgias.   Skin:  Negative for skin lesions and wound.   Neurological:  Negative for dizziness, weakness, light-headedness and numbness.   Hematological:  Does not bruise/bleed easily.    Psychiatric/Behavioral:  Negative for depressed mood.         I have reviewed the following portions of the patient's history:History of Present Illness and review of systems.    Past Medical History:   Past Medical History:   Diagnosis Date    Backache     Common migraine     Fibromyalgia     Headache 1998    History of Chiari malformation     Low back pain 2008    MRSA (methicillin resistant Staphylococcus aureus) 4/2009    PCOS (polycystic ovarian syndrome)     Screening for diabetes mellitus 6/11/2024    Seizures 2017       Past Surgical History:   Past Surgical History:   Procedure Laterality Date    BRAIN SURGERY  10/2017    Chiari malformation decompression    BREAST BIOPSY      HYSTERECTOMY  05/2020    TONSILLECTOMY         Family History:   Family History   Problem Relation Age of Onset    Breast cancer Mother     Arthritis Mother     Asthma Mother     Cancer Mother     Diabetes Mother     Early death Mother     Hyperlipidemia Mother     Other Mother         Lupus    Migraines Mother     Allergies Mother     Mental illness Mother     Alcohol abuse Father     Drug abuse Father     Anxiety disorder Daughter     Depression Daughter     Mental illness Daughter     Anxiety disorder Maternal Aunt     Arthritis Maternal Aunt     Asthma Maternal Aunt     Depression Maternal Aunt     Breast cancer Paternal Aunt     Ovarian cancer Maternal Grandmother     Arthritis Maternal Grandmother     Arthritis Maternal Grandfather     Asthma Maternal Grandfather     Diabetes Maternal Grandfather     Hyperlipidemia Maternal Grandfather     Asthma Other     Mental illness Other     Diabetes Other     Allergies Other        Social History:   Social History     Socioeconomic History    Marital status: Legally    Tobacco Use    Smoking status: Every Day     Current packs/day: 0.00     Average packs/day: 0.5 packs/day for 17.6 years (8.8 ttl pk-yrs)     Types: Cigarettes     Start date: 2005     Last attempt to quit:  "2022     Years since quittin.0    Smokeless tobacco: Never   Vaping Use    Vaping status: Former    Substances: Nicotine, Flavoring    Devices: Disposable   Substance and Sexual Activity    Alcohol use: Yes     Alcohol/week: 3.0 standard drinks of alcohol     Types: 1 Glasses of wine, 2 Cans of beer per week     Comment: I drink mixed drinks once in awhile on special occasions    Drug use: Never    Sexual activity: Yes     Partners: Male     Birth control/protection: Surgical       Medications:   Current Outpatient Medications:     albuterol sulfate  (90 Base) MCG/ACT inhaler, Inhale 2 puffs Every 4 (Four) Hours As Needed for Wheezing., Disp: 18 g, Rfl: 2    ibuprofen (ADVIL,MOTRIN) 200 MG tablet, Take 1 tablet by mouth Every 6 (Six) Hours As Needed for Mild Pain., Disp: , Rfl:     meclizine (ANTIVERT) 25 MG tablet, Take 1 tablet by mouth 3 (Three) Times a Day As Needed for Dizziness., Disp: 30 tablet, Rfl: 0    montelukast (SINGULAIR) 10 MG tablet, TAKE ONE TABLET BY MOUTH ONCE NIGHTLY, Disp: 90 tablet, Rfl: 1    Semaglutide-Weight Management (Wegovy) 0.25 MG/0.5ML solution auto-injector, Inject 0.5 mL under the skin into the appropriate area as directed 1 (One) Time Per Week. Call for next dose, Disp: 2 mL, Rfl: 0    diclofenac (VOLTAREN) 75 MG EC tablet, Take 1 tablet by mouth 2 (Two) Times a Day With Meals., Disp: 60 tablet, Rfl: 1    Allergies:   Allergies   Allergen Reactions    Cipro [Ciprofloxacin Hcl] Hives and Rash    Latex Rash       I reviewed the patient's chief complaint, history of present illness, review of systems, past medical history, surgical history, family history, social history, medications and allergy list.     Objective    Vital Signs:   Vitals:    24 1102   BP: 118/76   Weight: 113 kg (250 lb)   Height: 160 cm (63\")     Body mass index is 44.29 kg/m².   Class 3 Severe Obesity (BMI >=40). Obesity-related health conditions include the following: hypertension, coronary " heart disease, diabetes mellitus, and dyslipidemias. Obesity is newly identified. BMI is is above average; BMI management plan is completed. We discussed portion control and increasing exercise.      In this visit the patient was advised to stop smoking and was offered tobacco cessation measures and resources, including NRT and/or medication intervention. At least 5 minutes was spent on face-to-face counseling regarding smoking cessation.     Ortho Exam:  Right knee exam:   There is swelling and effusion but no warmth or erythema of the knee.    The skin is clear.    Overall the alignment of the knee is varus   The patient has 5/5 strength with ankle plantar flexion, dorsiflexion, inversion, eversion, and great toe extension.    Sensation is grossly present to light touch in the superficial peroneal, deep peroneal, and tibial nerve distributions.    The dorsalis pedis pulse is 2+ and the foot is well perfused with brisk capillary refill.   Active ROM is 0° to 110°.   Passive ROM is 0° to 110°.   The knee shows no gross instability to varus/valgus stress testing, anterior/posterior drawer sign, and Lachman testing.    There is tenderness to palpation over the medial/lateral joint line. Patellar grind test is positive.   Hip ROM is full and painless.    Results Review:   Imaging Results (Last 24 Hours)       ** No results found for the last 24 hours. **        I personally reviewed and interpreted radiographs of the right knee from 6/6/2024.  No acute fracture or dislocation.  There is evidence of mild tricompartmental osteoarthritis.    Procedures    Assessment / Plan    Assessment/Plan:   Diagnoses and all orders for this visit:    1. Primary osteoarthritis of right knee (Primary)  -     diclofenac (VOLTAREN) 75 MG EC tablet; Take 1 tablet by mouth 2 (Two) Times a Day With Meals.  Dispense: 60 tablet; Refill: 1    2. Effusion of right knee    Right anterior and medial knee pain ongoing for the last 3 months.  She  denies any known mechanism of injury.  Pain is worse with increased weightbearing activity.  She has noted some instability especially with turning twisting movements.  She is active with her children.  She has been on Advil as well as oral steroids.  These helped for short duration of time but nothing long-lasting.  She is not currently taking any medications.  Radiographs of her right knee show mild tricompartmental degenerative changes.  We reviewed her radiographic findings in detail.  I discussed treatment options with her today.  We will start her on diclofenac to be taken twice per day to control her pain.  We will fit her for a custom fit knee brace.  She has not done well with over-the-counter knee braces.  She also has thigh to calf ratio that would not permit any of our other sizes working for her knee.  I believe these will give her the structural protection and she needs to function as desired.  She will follow with me as needed.    Previous documentation reviewed: 6/6/2024-office visit-Allan RUDOLPH.    Previous imaging studies reviewed: 6/6/2024-radiographs of the right knee.    Previous laboratory results reviewed: 6/18/2024-hemoglobin A1c 5.7%.    Follow Up:   Return if symptoms worsen or fail to improve.      Toni Marte MD  [facility] [place] [facility]

## 2024-09-27 ENCOUNTER — OFFICE VISIT (OUTPATIENT)
Dept: FAMILY MEDICINE CLINIC | Facility: CLINIC | Age: 39
End: 2024-09-27
Payer: MEDICAID

## 2024-09-27 VITALS
HEART RATE: 97 BPM | BODY MASS INDEX: 45.54 KG/M2 | OXYGEN SATURATION: 95 % | SYSTOLIC BLOOD PRESSURE: 128 MMHG | WEIGHT: 257 LBS | HEIGHT: 63 IN | DIASTOLIC BLOOD PRESSURE: 86 MMHG

## 2024-09-27 DIAGNOSIS — J02.9 SORE THROAT: ICD-10-CM

## 2024-09-27 DIAGNOSIS — J01.00 ACUTE NON-RECURRENT MAXILLARY SINUSITIS: Primary | ICD-10-CM

## 2024-09-27 LAB
EXPIRATION DATE: NORMAL
INTERNAL CONTROL: NORMAL
Lab: NORMAL
S PYO AG THROAT QL: NEGATIVE

## 2024-09-27 PROCEDURE — 99214 OFFICE O/P EST MOD 30 MIN: CPT | Performed by: NURSE PRACTITIONER

## 2024-09-27 PROCEDURE — 87880 STREP A ASSAY W/OPTIC: CPT | Performed by: NURSE PRACTITIONER

## 2024-09-27 PROCEDURE — 3079F DIAST BP 80-89 MM HG: CPT | Performed by: NURSE PRACTITIONER

## 2024-09-27 PROCEDURE — 3074F SYST BP LT 130 MM HG: CPT | Performed by: NURSE PRACTITIONER

## 2024-09-27 RX ORDER — CEFUROXIME AXETIL 500 MG/1
500 TABLET ORAL 2 TIMES DAILY
Qty: 20 TABLET | Refills: 0 | Status: SHIPPED | OUTPATIENT
Start: 2024-09-27

## 2024-09-30 LAB
EBV VCA IGG SER IA-ACNC: >600 U/ML (ref 0–17.9)
EBV VCA IGM SER IA-ACNC: <36 U/ML (ref 0–35.9)